# Patient Record
Sex: FEMALE | Race: BLACK OR AFRICAN AMERICAN | NOT HISPANIC OR LATINO | Employment: FULL TIME | URBAN - METROPOLITAN AREA
[De-identification: names, ages, dates, MRNs, and addresses within clinical notes are randomized per-mention and may not be internally consistent; named-entity substitution may affect disease eponyms.]

---

## 2023-05-11 LAB
NATRIURETIC PEPTIDE B (PG/ML) IN SER/PLAS: 67 PG/ML (ref 0–99)
TROPONIN I, HIGH SENSITIVITY: <3 NG/L (ref 0–34)

## 2023-08-28 LAB
ESTIMATED AVERAGE GLUCOSE FOR HBA1C: 100 MG/DL
HEMOGLOBIN A1C/HEMOGLOBIN TOTAL IN BLOOD: 5.1 %
THYROTROPIN (MIU/L) IN SER/PLAS BY DETECTION LIMIT <= 0.05 MIU/L: 1.7 MIU/L (ref 0.44–3.98)
THYROXINE (T4) FREE (NG/DL) IN SER/PLAS: 1.12 NG/DL (ref 0.78–1.48)

## 2023-12-04 PROBLEM — D84.9 IMMUNOCOMPROMISED STATE (MULTI): Status: ACTIVE | Noted: 2023-12-04

## 2023-12-04 PROBLEM — G47.33 MILD OBSTRUCTIVE SLEEP APNEA: Status: ACTIVE | Noted: 2023-12-04

## 2023-12-04 PROBLEM — E66.01 MORBID OBESITY (MULTI): Status: ACTIVE | Noted: 2023-12-04

## 2023-12-04 PROBLEM — R11.2 CINV (CHEMOTHERAPY-INDUCED NAUSEA AND VOMITING): Status: ACTIVE | Noted: 2023-12-04

## 2023-12-04 PROBLEM — E55.9 VITAMIN D DEFICIENCY: Status: ACTIVE | Noted: 2023-12-04

## 2023-12-04 PROBLEM — Z85.3 HISTORY OF LEFT BREAST CANCER: Status: ACTIVE | Noted: 2023-12-04

## 2023-12-04 PROBLEM — Z92.89 H/O SLEEP STUDY: Status: ACTIVE | Noted: 2023-12-04

## 2023-12-04 PROBLEM — C50.919 HER2-POSITIVE CARCINOMA OF BREAST (MULTI): Status: ACTIVE | Noted: 2023-12-04

## 2023-12-04 PROBLEM — K52.1 CHEMOTHERAPY-INDUCED DIARRHEA: Status: ACTIVE | Noted: 2023-12-04

## 2023-12-04 PROBLEM — H25.13 NUCLEAR SCLEROSIS OF BOTH EYES: Status: ACTIVE | Noted: 2023-12-04

## 2023-12-04 PROBLEM — R51.9 HEAD ACHE: Status: ACTIVE | Noted: 2023-12-04

## 2023-12-04 PROBLEM — Z90.11 STATUS POST RIGHT MASTECTOMY: Status: ACTIVE | Noted: 2023-12-04

## 2023-12-04 PROBLEM — C50.919 MALIGNANT NEOPLASM OF BREAST (MULTI): Status: ACTIVE | Noted: 2023-12-04

## 2023-12-04 PROBLEM — S86.819A TENDON RUPTURE, TRAUMATIC, PATELLA: Status: ACTIVE | Noted: 2023-12-04

## 2023-12-04 PROBLEM — R03.0 ELEVATED BLOOD PRESSURE READING: Status: ACTIVE | Noted: 2023-12-04

## 2023-12-04 PROBLEM — K63.5 COLONIC POLYP: Status: ACTIVE | Noted: 2023-12-04

## 2023-12-04 PROBLEM — M25.551 HIP PAIN, RIGHT: Status: ACTIVE | Noted: 2023-12-04

## 2023-12-04 PROBLEM — L60.1 ONYCHOLYSIS: Status: ACTIVE | Noted: 2023-12-04

## 2023-12-04 PROBLEM — Z17.31 HER2-POSITIVE CARCINOMA OF BREAST: Status: ACTIVE | Noted: 2023-12-04

## 2023-12-04 PROBLEM — N62 MACROMASTIA: Status: ACTIVE | Noted: 2023-12-04

## 2023-12-04 PROBLEM — R50.9 FEVER: Status: ACTIVE | Noted: 2023-12-04

## 2023-12-04 PROBLEM — K57.90 DIVERTICULOSIS: Status: ACTIVE | Noted: 2023-12-04

## 2023-12-04 PROBLEM — H04.123 BILATERAL DRY EYES: Status: ACTIVE | Noted: 2023-12-04

## 2023-12-04 PROBLEM — M16.11 PRIMARY OSTEOARTHRITIS OF RIGHT HIP: Status: ACTIVE | Noted: 2023-12-04

## 2023-12-04 PROBLEM — R01.1 NEWLY RECOGNIZED HEART MURMUR: Status: ACTIVE | Noted: 2023-12-04

## 2023-12-04 PROBLEM — R10.9 ABDOMINAL PAIN: Status: ACTIVE | Noted: 2023-12-04

## 2023-12-04 PROBLEM — H40.003 GLAUCOMA SUSPECT OF BOTH EYES: Status: ACTIVE | Noted: 2023-12-04

## 2023-12-04 PROBLEM — T45.1X5A CHEMOTHERAPY-INDUCED DIARRHEA: Status: ACTIVE | Noted: 2023-12-04

## 2023-12-04 PROBLEM — R00.2 PALPITATIONS: Status: ACTIVE | Noted: 2023-12-04

## 2023-12-04 PROBLEM — Z90.12 STATUS POST LEFT MASTECTOMY: Status: ACTIVE | Noted: 2023-12-04

## 2023-12-04 PROBLEM — E53.8 VITAMIN B12 DEFICIENCY: Status: ACTIVE | Noted: 2023-12-04

## 2023-12-04 PROBLEM — T45.1X5A CINV (CHEMOTHERAPY-INDUCED NAUSEA AND VOMITING): Status: ACTIVE | Noted: 2023-12-04

## 2023-12-04 PROBLEM — R52 BODY ACHES: Status: ACTIVE | Noted: 2023-12-04

## 2023-12-04 PROBLEM — R79.89 LOW VITAMIN D LEVEL: Status: ACTIVE | Noted: 2023-12-04

## 2023-12-04 PROBLEM — M25.569 JOINT PAIN, KNEE: Status: ACTIVE | Noted: 2023-12-04

## 2023-12-04 PROBLEM — R35.0 URINARY FREQUENCY: Status: ACTIVE | Noted: 2023-12-04

## 2023-12-04 PROBLEM — R68.2 DRY MOUTH: Status: ACTIVE | Noted: 2023-12-04

## 2023-12-04 PROBLEM — R05.9 COUGH: Status: ACTIVE | Noted: 2023-12-04

## 2023-12-04 PROBLEM — G47.00 INSOMNIA: Status: ACTIVE | Noted: 2023-12-04

## 2023-12-04 PROBLEM — C50.912 BREAST CANCER, LEFT (MULTI): Status: ACTIVE | Noted: 2023-12-04

## 2023-12-04 RX ORDER — LIDOCAINE AND PRILOCAINE 25; 25 MG/G; MG/G
1 CREAM TOPICAL
COMMUNITY
Start: 2021-12-03

## 2023-12-04 RX ORDER — CHOLECALCIFEROL (VITAMIN D3) 125 MCG
125 CAPSULE ORAL DAILY
COMMUNITY

## 2023-12-04 RX ORDER — VITAMIN E MIXED 400 UNIT
400 CAPSULE ORAL DAILY
COMMUNITY

## 2023-12-04 RX ORDER — MULTIVITAMIN
1 TABLET ORAL DAILY
COMMUNITY

## 2023-12-04 RX ORDER — ONDANSETRON HYDROCHLORIDE 8 MG/1
8 TABLET, FILM COATED ORAL EVERY 8 HOURS PRN
COMMUNITY
Start: 2021-12-03

## 2023-12-04 RX ORDER — DICLOFENAC SODIUM 75 MG/1
1 TABLET, DELAYED RELEASE ORAL
COMMUNITY
Start: 2023-08-07

## 2023-12-04 RX ORDER — CYCLOBENZAPRINE HCL 5 MG
5-10 TABLET ORAL EVERY 8 HOURS PRN
COMMUNITY
Start: 2023-09-15 | End: 2023-12-14

## 2023-12-04 RX ORDER — ACETAMINOPHEN, DIPHENHYDRAMINE HCL, PHENYLEPHRINE HCL 325; 25; 5 MG/1; MG/1; MG/1
1 TABLET ORAL DAILY
COMMUNITY

## 2023-12-04 RX ORDER — EXEMESTANE 25 MG/1
25 TABLET ORAL
COMMUNITY
End: 2023-12-19 | Stop reason: SDUPTHER

## 2023-12-08 ENCOUNTER — APPOINTMENT (OUTPATIENT)
Dept: HEMATOLOGY/ONCOLOGY | Facility: CLINIC | Age: 68
End: 2023-12-08

## 2023-12-19 ENCOUNTER — OFFICE VISIT (OUTPATIENT)
Dept: HEMATOLOGY/ONCOLOGY | Facility: HOSPITAL | Age: 68
End: 2023-12-19
Payer: MEDICARE

## 2023-12-19 VITALS
OXYGEN SATURATION: 97 % | HEIGHT: 68 IN | SYSTOLIC BLOOD PRESSURE: 113 MMHG | BODY MASS INDEX: 44.41 KG/M2 | DIASTOLIC BLOOD PRESSURE: 73 MMHG | WEIGHT: 293 LBS | HEART RATE: 105 BPM | TEMPERATURE: 98.1 F | RESPIRATION RATE: 20 BRPM

## 2023-12-19 DIAGNOSIS — C50.912 MALIGNANT NEOPLASM OF LEFT BREAST IN FEMALE, ESTROGEN RECEPTOR POSITIVE, UNSPECIFIED SITE OF BREAST (MULTI): ICD-10-CM

## 2023-12-19 DIAGNOSIS — Z17.0 MALIGNANT NEOPLASM OF LEFT BREAST IN FEMALE, ESTROGEN RECEPTOR POSITIVE, UNSPECIFIED SITE OF BREAST (MULTI): ICD-10-CM

## 2023-12-19 DIAGNOSIS — Z79.811 LONG TERM (CURRENT) USE OF AROMATASE INHIBITORS: ICD-10-CM

## 2023-12-19 PROCEDURE — 99214 OFFICE O/P EST MOD 30 MIN: CPT | Performed by: INTERNAL MEDICINE

## 2023-12-19 PROCEDURE — 1159F MED LIST DOCD IN RCRD: CPT | Performed by: INTERNAL MEDICINE

## 2023-12-19 PROCEDURE — 1125F AMNT PAIN NOTED PAIN PRSNT: CPT | Performed by: INTERNAL MEDICINE

## 2023-12-19 RX ORDER — EXEMESTANE 25 MG/1
25 TABLET ORAL
Qty: 30 TABLET | Refills: 5 | Status: SHIPPED | OUTPATIENT
Start: 2023-12-19

## 2023-12-19 ASSESSMENT — PAIN SCALES - GENERAL: PAINLEVEL: 4

## 2024-01-05 ENCOUNTER — APPOINTMENT (OUTPATIENT)
Dept: SLEEP MEDICINE | Facility: CLINIC | Age: 69
End: 2024-01-05
Payer: MEDICARE

## 2024-01-11 ENCOUNTER — TELEMEDICINE (OUTPATIENT)
Dept: PRIMARY CARE | Facility: CLINIC | Age: 69
End: 2024-01-11
Payer: MEDICARE

## 2024-01-11 DIAGNOSIS — E66.01 MORBID OBESITY DUE TO EXCESS CALORIES (MULTI): Primary | ICD-10-CM

## 2024-01-11 PROCEDURE — 99212 OFFICE O/P EST SF 10 MIN: CPT | Performed by: INTERNAL MEDICINE

## 2024-01-11 ASSESSMENT — ENCOUNTER SYMPTOMS
SHORTNESS OF BREATH: 0
CHILLS: 0
FEVER: 0
ABDOMINAL PAIN: 0
VOMITING: 0
NAUSEA: 0

## 2024-01-11 NOTE — PROGRESS NOTES
Subjective   Patient ID: Kim Henley is a 68 y.o. female who presents for virtual visit,    Patient states she is getting over what she believes was probably RSV. Sick contact from grandson. Is doing better now. Has had a recurrence of breat cancer and finished treatment. Needs a hip replacement but needs to lose weight first. Requesting a referral to weight loss clinic.         Review of Systems   Constitutional:  Negative for chills and fever.   Respiratory:  Negative for shortness of breath.    Cardiovascular:  Negative for chest pain.   Gastrointestinal:  Negative for abdominal pain, nausea and vomiting.       Objective   There were no vitals taken for this visit.    Physical Exam  Virtual visit  Assessment/Plan   Morbid obesity: will refer to weight loss clinic

## 2024-01-30 DIAGNOSIS — E66.01 MORBID OBESITY DUE TO EXCESS CALORIES (MULTI): Primary | ICD-10-CM

## 2024-02-05 ENCOUNTER — APPOINTMENT (OUTPATIENT)
Dept: ORTHOPEDIC SURGERY | Facility: CLINIC | Age: 69
End: 2024-02-05
Payer: MEDICARE

## 2024-03-11 ENCOUNTER — APPOINTMENT (OUTPATIENT)
Dept: ORTHOPEDIC SURGERY | Facility: CLINIC | Age: 69
End: 2024-03-11
Payer: MEDICARE

## 2024-04-08 ENCOUNTER — OFFICE VISIT (OUTPATIENT)
Dept: PRIMARY CARE | Facility: CLINIC | Age: 69
End: 2024-04-08
Payer: MEDICARE

## 2024-04-08 VITALS
HEIGHT: 67 IN | TEMPERATURE: 97.9 F | HEART RATE: 109 BPM | BODY MASS INDEX: 45.04 KG/M2 | RESPIRATION RATE: 16 BRPM | DIASTOLIC BLOOD PRESSURE: 81 MMHG | SYSTOLIC BLOOD PRESSURE: 125 MMHG | WEIGHT: 287 LBS | OXYGEN SATURATION: 98 %

## 2024-04-08 DIAGNOSIS — M25.551 HIP PAIN, RIGHT: Primary | ICD-10-CM

## 2024-04-08 PROCEDURE — 1036F TOBACCO NON-USER: CPT | Performed by: INTERNAL MEDICINE

## 2024-04-08 PROCEDURE — 99213 OFFICE O/P EST LOW 20 MIN: CPT | Performed by: INTERNAL MEDICINE

## 2024-04-08 PROCEDURE — 1170F FXNL STATUS ASSESSED: CPT | Performed by: INTERNAL MEDICINE

## 2024-04-08 PROCEDURE — 1125F AMNT PAIN NOTED PAIN PRSNT: CPT | Performed by: INTERNAL MEDICINE

## 2024-04-08 ASSESSMENT — ENCOUNTER SYMPTOMS
OCCASIONAL FEELINGS OF UNSTEADINESS: 1
NAUSEA: 0
VOMITING: 0
SHORTNESS OF BREATH: 0
CHILLS: 0
DEPRESSION: 0
LOSS OF SENSATION IN FEET: 0
ABDOMINAL PAIN: 0
FEVER: 0

## 2024-04-08 ASSESSMENT — PAIN SCALES - GENERAL: PAINLEVEL: 5

## 2024-04-08 ASSESSMENT — PATIENT HEALTH QUESTIONNAIRE - PHQ9
SUM OF ALL RESPONSES TO PHQ9 QUESTIONS 1 AND 2: 0
2. FEELING DOWN, DEPRESSED OR HOPELESS: NOT AT ALL
1. LITTLE INTEREST OR PLEASURE IN DOING THINGS: NOT AT ALL

## 2024-04-08 ASSESSMENT — ACTIVITIES OF DAILY LIVING (ADL)
MANAGING_FINANCES: INDEPENDENT
DRESSING: INDEPENDENT
GROCERY_SHOPPING: INDEPENDENT
TAKING_MEDICATION: INDEPENDENT
BATHING: INDEPENDENT
DOING_HOUSEWORK: INDEPENDENT

## 2024-04-08 NOTE — PROGRESS NOTES
"Subjective   Patient ID: Kim Henley is a 68 y.o. female who presents for follow up.    Presents for follow up. Will be getting hip replacement surgery in [facility] next [place]. Will need some preadmission testing. Also requesting referral to Podiatry for her toenails damaged by chemo.         Review of Systems   Constitutional:  Negative for chills and fever.   Respiratory:  Negative for shortness of breath.    Cardiovascular:  Negative for chest pain.   Gastrointestinal:  Negative for abdominal pain, nausea and vomiting.       Objective   /81   Pulse 109   Temp 36.6 °C (97.9 °F)   Resp 16   Ht 1.702 m (5' 7\")   Wt 130 kg (287 lb)   SpO2 98%   BMI 44.95 kg/m²     Physical Exam  Gen appearance: well groomed in NAD  A & O: alert and oriented x 3  CV: RRR   Lungs: CTA bilaterally  Extr: no edema. Foot exam deferred    Assessment/Plan   Hip pain: will return for preadmission testing needed for her surgery.       "

## 2024-04-12 ENCOUNTER — TELEPHONE (OUTPATIENT)
Dept: ORTHOPEDIC SURGERY | Facility: HOSPITAL | Age: 69
End: 2024-04-12
Payer: MEDICARE

## 2024-04-12 DIAGNOSIS — M25.569 ARTHRALGIA OF KNEE, UNSPECIFIED LATERALITY: ICD-10-CM

## 2024-04-12 DIAGNOSIS — S86.819D: ICD-10-CM

## 2024-04-12 NOTE — TELEPHONE ENCOUNTER
Patient called and recommend a disability parking placard.     Her previous placard will be expiring and was for 5 years. Renewed and sent to patient via [employer].     Nydia Snow LPN

## 2024-04-29 ENCOUNTER — APPOINTMENT (OUTPATIENT)
Dept: ORTHOPEDIC SURGERY | Facility: CLINIC | Age: 69
End: 2024-04-29
Payer: MEDICARE

## 2024-04-29 ENCOUNTER — OFFICE VISIT (OUTPATIENT)
Dept: PRIMARY CARE | Facility: CLINIC | Age: 69
End: 2024-04-29
Payer: MEDICARE

## 2024-04-29 VITALS
BODY MASS INDEX: 45.71 KG/M2 | DIASTOLIC BLOOD PRESSURE: 77 MMHG | TEMPERATURE: 97.3 F | HEART RATE: 97 BPM | WEIGHT: 291.2 LBS | OXYGEN SATURATION: 99 % | RESPIRATION RATE: 18 BRPM | HEIGHT: 67 IN | SYSTOLIC BLOOD PRESSURE: 117 MMHG

## 2024-04-29 DIAGNOSIS — Z01.818 PREOP GENERAL PHYSICAL EXAM: Primary | ICD-10-CM

## 2024-04-29 DIAGNOSIS — M25.551 HIP PAIN, RIGHT: ICD-10-CM

## 2024-04-29 LAB
ALBUMIN SERPL BCP-MCNC: 4.3 G/DL (ref 3.4–5)
ALP SERPL-CCNC: 91 U/L (ref 33–136)
ALT SERPL W P-5'-P-CCNC: 15 U/L (ref 7–45)
ANION GAP SERPL CALC-SCNC: 14 MMOL/L (ref 10–20)
APTT PPP: 35 SECONDS (ref 27–38)
AST SERPL W P-5'-P-CCNC: 33 U/L (ref 9–39)
BILIRUB SERPL-MCNC: 0.4 MG/DL (ref 0–1.2)
BUN SERPL-MCNC: 16 MG/DL (ref 6–23)
CALCIUM SERPL-MCNC: 9.4 MG/DL (ref 8.6–10.6)
CHLORIDE SERPL-SCNC: 102 MMOL/L (ref 98–107)
CO2 SERPL-SCNC: 28 MMOL/L (ref 21–32)
CREAT SERPL-MCNC: 0.68 MG/DL (ref 0.5–1.05)
EGFRCR SERPLBLD CKD-EPI 2021: >90 ML/MIN/1.73M*2
ERYTHROCYTE [DISTWIDTH] IN BLOOD BY AUTOMATED COUNT: 13.2 % (ref 11.5–14.5)
EST. AVERAGE GLUCOSE BLD GHB EST-MCNC: 91 MG/DL
GLUCOSE SERPL-MCNC: 70 MG/DL (ref 74–99)
HBA1C MFR BLD: 4.8 %
HCT VFR BLD AUTO: 42.9 % (ref 36–46)
HGB BLD-MCNC: 14.1 G/DL (ref 12–16)
INR PPP: 1 (ref 0.9–1.1)
MCH RBC QN AUTO: 32.6 PG (ref 26–34)
MCHC RBC AUTO-ENTMCNC: 32.9 G/DL (ref 32–36)
MCV RBC AUTO: 99 FL (ref 80–100)
NRBC BLD-RTO: 0 /100 WBCS (ref 0–0)
PLATELET # BLD AUTO: 214 X10*3/UL (ref 150–450)
POC APPEARANCE, URINE: CLEAR
POC BILIRUBIN, URINE: ABNORMAL
POC BLOOD, URINE: NEGATIVE
POC COLOR, URINE: YELLOW
POC GLUCOSE, URINE: NEGATIVE MG/DL
POC KETONES, URINE: NEGATIVE MG/DL
POC LEUKOCYTES, URINE: NEGATIVE
POC NITRITE,URINE: NEGATIVE
POC PH, URINE: 6 PH
POC PROTEIN, URINE: ABNORMAL MG/DL
POC SPECIFIC GRAVITY, URINE: >=1.03
POC UROBILINOGEN, URINE: 1 EU/DL
POTASSIUM SERPL-SCNC: 6 MMOL/L (ref 3.5–5.3)
PROT SERPL-MCNC: 7.7 G/DL (ref 6.4–8.2)
PROTHROMBIN TIME: 10.9 SECONDS (ref 9.8–12.8)
RBC # BLD AUTO: 4.32 X10*6/UL (ref 4–5.2)
SODIUM SERPL-SCNC: 138 MMOL/L (ref 136–145)
WBC # BLD AUTO: 3.9 X10*3/UL (ref 4.4–11.3)

## 2024-04-29 PROCEDURE — 99214 OFFICE O/P EST MOD 30 MIN: CPT | Performed by: INTERNAL MEDICINE

## 2024-04-29 PROCEDURE — 83036 HEMOGLOBIN GLYCOSYLATED A1C: CPT | Mod: GA | Performed by: INTERNAL MEDICINE

## 2024-04-29 PROCEDURE — 85610 PROTHROMBIN TIME: CPT | Mod: GA | Performed by: INTERNAL MEDICINE

## 2024-04-29 PROCEDURE — 80053 COMPREHEN METABOLIC PANEL: CPT | Performed by: INTERNAL MEDICINE

## 2024-04-29 PROCEDURE — 1125F AMNT PAIN NOTED PAIN PRSNT: CPT | Performed by: INTERNAL MEDICINE

## 2024-04-29 PROCEDURE — 85027 COMPLETE CBC AUTOMATED: CPT | Performed by: INTERNAL MEDICINE

## 2024-04-29 PROCEDURE — 1036F TOBACCO NON-USER: CPT | Performed by: INTERNAL MEDICINE

## 2024-04-29 PROCEDURE — 93010 ELECTROCARDIOGRAM REPORT: CPT | Performed by: INTERNAL MEDICINE

## 2024-04-29 PROCEDURE — 81002 URINALYSIS NONAUTO W/O SCOPE: CPT | Performed by: INTERNAL MEDICINE

## 2024-04-29 PROCEDURE — 36415 COLL VENOUS BLD VENIPUNCTURE: CPT | Performed by: INTERNAL MEDICINE

## 2024-04-29 PROCEDURE — 93005 ELECTROCARDIOGRAM TRACING: CPT | Performed by: INTERNAL MEDICINE

## 2024-04-29 PROCEDURE — 87086 URINE CULTURE/COLONY COUNT: CPT | Mod: GA | Performed by: INTERNAL MEDICINE

## 2024-04-29 SDOH — ECONOMIC STABILITY: FOOD INSECURITY: WITHIN THE PAST 12 MONTHS, YOU WORRIED THAT YOUR FOOD WOULD RUN OUT BEFORE YOU GOT MONEY TO BUY MORE.: NEVER TRUE

## 2024-04-29 SDOH — ECONOMIC STABILITY: FOOD INSECURITY: WITHIN THE PAST 12 MONTHS, THE FOOD YOU BOUGHT JUST DIDN'T LAST AND YOU DIDN'T HAVE MONEY TO GET MORE.: NEVER TRUE

## 2024-04-29 ASSESSMENT — ENCOUNTER SYMPTOMS
CHILLS: 0
FEVER: 0
DEPRESSION: 0
ABDOMINAL PAIN: 0
NAUSEA: 0
OCCASIONAL FEELINGS OF UNSTEADINESS: 1
SHORTNESS OF BREATH: 0
VOMITING: 0
LOSS OF SENSATION IN FEET: 0

## 2024-04-29 ASSESSMENT — LIFESTYLE VARIABLES
AUDIT-C TOTAL SCORE: 0
HOW OFTEN DO YOU HAVE A DRINK CONTAINING ALCOHOL: NEVER
HOW OFTEN DO YOU HAVE SIX OR MORE DRINKS ON ONE OCCASION: NEVER
SKIP TO QUESTIONS 9-10: 1
HOW MANY STANDARD DRINKS CONTAINING ALCOHOL DO YOU HAVE ON A TYPICAL DAY: PATIENT DOES NOT DRINK

## 2024-04-29 ASSESSMENT — PAIN SCALES - GENERAL: PAINLEVEL: 4

## 2024-04-29 NOTE — PROGRESS NOTES
"Subjective   Patient ID: Kim Henley is a 68 y.o. female who presents for Follow-up (Having hip surgery).    Presents for preop evaluation for upcoming hip surgery. No complaints today except for some increased hip pain secondary to having stopped her NSAID's in prep for her upcoming total right hip arthroplasty.          Review of Systems   Constitutional:  Negative for chills and fever.   Respiratory:  Negative for shortness of breath.    Cardiovascular:  Negative for chest pain.   Gastrointestinal:  Negative for abdominal pain, nausea and vomiting.       Objective   /77 (BP Location: Left arm, Patient Position: Sitting, BP Cuff Size: Adult)   Pulse 97   Temp 36.3 °C (97.3 °F) (Temporal)   Resp 18   Ht 1.702 m (5' 7\")   Wt 132 kg (291 lb 3.2 oz)   SpO2 99%   BMI 45.61 kg/m²     Physical Exam  Gen: well groomed in NAD  A & O: alert and oriented x 3  HEENT: PEERL, EOMI, TM's clear bilaterally, no carotid bruits, no palpable thyroid nodules  CV: RRR  Lungs: CTA bilaterally  Abd: soft, NT/ND,no palp masses  Extr: no edema  Breasts: no palpable nodules or nipple discharge  Neuro: CN 2-12 intact, motor 5/5, sensation intact  Pelvic: deferred to Gyn   Assessment/Plan   Health maintenance: patient reports no constitutional symptoms. Reports has stopped NSAIDs in prep for her upcoming surgery/right hip arthroplasty in [facility]. No cardiac clearance was requested by the surgeon. Labs and urine drawn as requested by the surgeon, Dr. Rio Renteria.   Right hip pain: patient will continue with Tylenol for pain relief.       "

## 2024-04-30 DIAGNOSIS — R89.9 ABNORMAL LABORATORY TEST: Primary | ICD-10-CM

## 2024-05-01 LAB — BACTERIA UR CULT: ABNORMAL

## 2024-05-02 DIAGNOSIS — N39.0 URINARY TRACT INFECTION WITHOUT HEMATURIA, SITE UNSPECIFIED: ICD-10-CM

## 2024-05-08 ENCOUNTER — APPOINTMENT (OUTPATIENT)
Dept: PRIMARY CARE | Facility: CLINIC | Age: 69
End: 2024-05-08
Payer: MEDICARE

## 2024-05-09 ENCOUNTER — CLINICAL SUPPORT (OUTPATIENT)
Dept: PRIMARY CARE | Facility: CLINIC | Age: 69
End: 2024-05-09
Payer: MEDICARE

## 2024-05-09 DIAGNOSIS — R89.9 ABNORMAL LABORATORY TEST: ICD-10-CM

## 2024-05-09 DIAGNOSIS — E87.6 HYPOKALEMIA: Primary | ICD-10-CM

## 2024-05-09 DIAGNOSIS — Z09 NEED FOR IMMUNIZATION FOLLOW-UP: Primary | ICD-10-CM

## 2024-05-09 DIAGNOSIS — Z23 IMMUNIZATION DUE: ICD-10-CM

## 2024-05-09 DIAGNOSIS — N39.0 URINARY TRACT INFECTION WITHOUT HEMATURIA, SITE UNSPECIFIED: ICD-10-CM

## 2024-05-09 LAB
ANION GAP SERPL CALC-SCNC: 14 MMOL/L (ref 10–20)
BUN SERPL-MCNC: 20 MG/DL (ref 6–23)
CALCIUM SERPL-MCNC: 9.8 MG/DL (ref 8.6–10.6)
CHLORIDE SERPL-SCNC: 102 MMOL/L (ref 98–107)
CO2 SERPL-SCNC: 28 MMOL/L (ref 21–32)
CREAT SERPL-MCNC: 0.75 MG/DL (ref 0.5–1.05)
EGFRCR SERPLBLD CKD-EPI 2021: 87 ML/MIN/1.73M*2
GLUCOSE SERPL-MCNC: 83 MG/DL (ref 74–99)
POTASSIUM SERPL-SCNC: 4.1 MMOL/L (ref 3.5–5.3)
SODIUM SERPL-SCNC: 140 MMOL/L (ref 136–145)

## 2024-05-09 PROCEDURE — 87086 URINE CULTURE/COLONY COUNT: CPT | Performed by: INTERNAL MEDICINE

## 2024-05-09 PROCEDURE — 36415 COLL VENOUS BLD VENIPUNCTURE: CPT

## 2024-05-09 PROCEDURE — 80048 BASIC METABOLIC PNL TOTAL CA: CPT

## 2024-05-09 PROCEDURE — G0009 ADMIN PNEUMOCOCCAL VACCINE: HCPCS | Performed by: INTERNAL MEDICINE

## 2024-05-09 NOTE — PROGRESS NOTES
Pt presented for blood Blood draw draw and Prevnar vaccine. Venipuncture successful. Prevnar vaccine given left deltoid pt tolerated well VIS given and reviewed.

## 2024-05-10 LAB — BACTERIA UR CULT: NORMAL

## 2024-05-20 ENCOUNTER — TELEPHONE (OUTPATIENT)
Dept: PRIMARY CARE | Facility: CLINIC | Age: 69
End: 2024-05-20
Payer: MEDICARE

## 2024-05-21 DIAGNOSIS — Z01.89 PHYSICAL THERAPY EVALUATION, INITIAL: Primary | ICD-10-CM

## 2024-05-24 ENCOUNTER — TELEPHONE (OUTPATIENT)
Dept: HOME HEALTH SERVICES | Facility: HOME HEALTH | Age: 69
End: 2024-05-24
Payer: MEDICARE

## 2024-05-24 ENCOUNTER — HOME HEALTH ADMISSION (OUTPATIENT)
Dept: HOME HEALTH SERVICES | Facility: HOME HEALTH | Age: 69
End: 2024-05-24
Payer: MEDICARE

## 2024-05-24 DIAGNOSIS — M25.551 HIP PAIN, RIGHT: Primary | ICD-10-CM

## 2024-05-24 NOTE — TELEPHONE ENCOUNTER
Brayan Victoria,    [facility] has received a [facility] referral on this pt for home physical therapy s/p R THR on 5/16. Unfortunately, we are unable to use your office visit note on 4/29 as our F2F as this visit was prior to the patient's surgery.  Pt must have a F2F s/p ortho surgery. Aleksandra re-submit referral once pt has had a F2F visit. If you have any questions, you can reach [facility] at .    Thank you,    Heather Grayson RN

## 2024-06-04 DIAGNOSIS — M25.551 HIP PAIN, RIGHT: Primary | ICD-10-CM

## 2024-06-05 ENCOUNTER — APPOINTMENT (OUTPATIENT)
Dept: RADIOLOGY | Facility: HOSPITAL | Age: 69
End: 2024-06-05
Payer: MEDICARE

## 2024-06-14 ENCOUNTER — APPOINTMENT (OUTPATIENT)
Dept: HEMATOLOGY/ONCOLOGY | Facility: CLINIC | Age: 69
End: 2024-06-14
Payer: MEDICARE

## 2024-06-18 ENCOUNTER — APPOINTMENT (OUTPATIENT)
Dept: HEMATOLOGY/ONCOLOGY | Facility: HOSPITAL | Age: 69
End: 2024-06-18
Payer: MEDICARE

## 2024-06-20 ENCOUNTER — OFFICE VISIT (OUTPATIENT)
Dept: HEMATOLOGY/ONCOLOGY | Facility: CLINIC | Age: 69
End: 2024-06-20
Payer: MEDICARE

## 2024-06-20 VITALS
WEIGHT: 293 LBS | TEMPERATURE: 97.5 F | DIASTOLIC BLOOD PRESSURE: 72 MMHG | HEART RATE: 102 BPM | OXYGEN SATURATION: 96 % | SYSTOLIC BLOOD PRESSURE: 119 MMHG | BODY MASS INDEX: 46.54 KG/M2

## 2024-06-20 DIAGNOSIS — Z92.21 HISTORY OF ANTINEOPLASTIC CHEMOTHERAPY: ICD-10-CM

## 2024-06-20 DIAGNOSIS — C50.912 MALIGNANT NEOPLASM OF LEFT BREAST IN FEMALE, ESTROGEN RECEPTOR POSITIVE, UNSPECIFIED SITE OF BREAST (MULTI): ICD-10-CM

## 2024-06-20 DIAGNOSIS — Z79.811 ENCOUNTER FOR MONITORING AROMATASE INHIBITOR THERAPY: ICD-10-CM

## 2024-06-20 DIAGNOSIS — R39.81 FUNCTIONAL URINARY INCONTINENCE: ICD-10-CM

## 2024-06-20 DIAGNOSIS — Z51.81 ENCOUNTER FOR MONITORING AROMATASE INHIBITOR THERAPY: ICD-10-CM

## 2024-06-20 DIAGNOSIS — Z17.0 MALIGNANT NEOPLASM OF LEFT BREAST IN FEMALE, ESTROGEN RECEPTOR POSITIVE, UNSPECIFIED SITE OF BREAST (MULTI): ICD-10-CM

## 2024-06-20 DIAGNOSIS — Z17.0 MALIGNANT NEOPLASM OF RIGHT BREAST IN FEMALE, ESTROGEN RECEPTOR POSITIVE, UNSPECIFIED SITE OF BREAST (MULTI): Primary | ICD-10-CM

## 2024-06-20 DIAGNOSIS — C50.911 MALIGNANT NEOPLASM OF RIGHT BREAST IN FEMALE, ESTROGEN RECEPTOR POSITIVE, UNSPECIFIED SITE OF BREAST (MULTI): Primary | ICD-10-CM

## 2024-06-20 DIAGNOSIS — Z90.13 HISTORY OF BILATERAL MASTECTOMY: ICD-10-CM

## 2024-06-20 PROCEDURE — 1126F AMNT PAIN NOTED NONE PRSNT: CPT | Performed by: NURSE PRACTITIONER

## 2024-06-20 PROCEDURE — 99215 OFFICE O/P EST HI 40 MIN: CPT | Performed by: NURSE PRACTITIONER

## 2024-06-20 PROCEDURE — 1159F MED LIST DOCD IN RCRD: CPT | Performed by: NURSE PRACTITIONER

## 2024-06-20 PROCEDURE — 1160F RVW MEDS BY RX/DR IN RCRD: CPT | Performed by: NURSE PRACTITIONER

## 2024-06-20 RX ORDER — EXEMESTANE 25 MG/1
25 TABLET ORAL
Qty: 30 TABLET | Refills: 5 | Status: SHIPPED | OUTPATIENT
Start: 2024-06-20

## 2024-06-20 ASSESSMENT — PAIN SCALES - GENERAL: PAINLEVEL: 0-NO PAIN

## 2024-06-20 NOTE — PROGRESS NOTES
Oncology Follow-Up    Kim Henley  [id number]            Breast         AJCC Edition: 8th (AJCC), Diagnosis Date: 23-Oct-2021, IA, pT1b pN0 cM0 G3      Treatment Synopsis:    67-year-old postmenopausal AA female with prior history of left-sided invasive ductal carcinoma, stage IA (T1b N0 M0).  Patient now has contralateral her2 positive  breast cancer with details as foThe patient's breast cancer was diagnosed on October 23, 2021, and is estrogen receptor positive, progesterone receptor negative, and HER-2/godwin positive.         CURRENT THERAPY: Aromasin 25 mg by mouth daily     Details of her history:   1987: Diagnosed with left breast cancer. Patient underwent a left lumpectomy with ALND and radiation   2008: Left breast recurrence. Patient underwent a left breast mastectomy with a Latissimus Dorsi Flap, Arimidex x 5 years   08/13/2021: Patient underwent a right mammogram and US. This showed 0.6 x 0.5 x 0.5 cm mass at 12:00, 1 cm from the nipple.    09/07/2021: Patient underwent an US guided core biopsy of right breast mass   10/28/2021: Patient underwent a right breast mastectomy.    12/03/2021: Received her first dose of Taxol Herceptin   03/03/2021: First dose maintenance Herceptin    12/01/2022: Received the last dose of  maintenance Trastuzumab   06/02/2023: Started Aromasin 25 mg by mouth daily      Omid Alvarez presents for her Oncology Follow Up Visit accompanied by her , Mikie. She is a [place] woman who recently had a right hip replacement with a good outcome. Her issue is now with her left leg as she had compensated for her right for so long, it is now painful.  is using a walker to ambulate. She is in PT for this.  rates her energy level as 5-6/10 and reports no distress. She has urinary incontinence. She reports right ankle swelling since surgery.  is bothered by her inability to lose weight. She states, and her  agrees, that she eats very little and still cannot lose weight. She  states she never had abdominal obesity until recently.  continues on exemestane, she felt that this may be the reason she cannot lose weight. She is also concerned that exemestane may cause heart issues.  denies any unusual headaches, depression, cough, shortness of breath, problems swallowing, changes in chest/breast area (though does want me to look at one small area that is new), abdominal pain, bone or muscle pain (other then mentioned above), vaginal bleeding, rectal bleeding, blood in the urine, vaginal dryness, swelling arms or legs, new or unusual skin moles or lesions.     Objective      Vitals:    06/20/24 1403   BP: 119/72   Pulse: 102   Temp: 36.4 °C (97.5 °F)   SpO2: 96%        Constitutional: Well developed, alert/oriented x3, no distress, cooperative   Eyes: clear sclera   ENMT: mucous membranes moist, no apparent lesions   Head/Neck: Neck supple, no bruits   Respiratory/Thorax: Patent airways, normal breath sounds with good chest expansion   Cardiovascular: Regular rate and rhythm, no murmurs, 2+ equal pulses of the extremities,   Gastrointestinal: Nondistended, soft, non-tender, no masses palpable, no organomegaly   Musculoskeletal: ROM intact, no joint swelling, normal strength   Extremities: normal extremities, no edema, cyanosis, contusions or wounds   Neurological: alert and oriented x3,  normal strength   Breast:     Lymphatic: No significant lymphadenopathy   Psychological: Appropriate mood and behavior   Skin: Warm and dry, no lesions, no rashes      Physical Exam  Chest:          Comments: Bilateral mastectomy with well healed incisions, and well healed bilateral axillary incisions; no masses, nodules, skin changes, discharge. There is redundant skin (dog ears) bilaterally with the right more significant. The area of concern in the left chest/breast area is fat necrosis.         Lab Results   Component Value Date    WBC 3.9 (L) 04/29/2024    HGB 14.1 04/29/2024    HCT 42.9 04/29/2024     MCV 99 2024     2024       Chemistry    Lab Results   Component Value Date/Time     2024 1239    K 4.1 2024 1239     2024 1239    CO2 28 2024 1239    BUN 20 2024 1239    CREATININE 0.75 2024 1239    Lab Results   Component Value Date/Time    CALCIUM 9.8 2024 1239    ALKPHOS 91 2024 1409    AST 33 2024 1409    ALT 15 2024 1409    BILITOT 0.4 2024 1409         Imagin2022 09:45 [phone] [id number]     Exam Information    Status Exam Begun Exam Ended   Final 2022 09:15 2022 09:31     Study Result    Narrative & Impression   MRN: [id number]  Patient Name: GALLO SANDOVAL     STUDY:  DIGITAL DIAG MAMM LEFT W/MARIOLA UNILAT;  2022 9:31 am     ACCESSION NUMBER(S):  [id number]     ORDERING CLINICIAN:  BENJAMIN BILLINGSLEY     INDICATION:  Screening. History of breast cancer. Left mastectomy in  with  TRAM reconstruction. Right mastectomy in .     COMPARISON:  2021, 2020, 2019     FINDINGS:  2D and tomosynthesis images were reviewed at 1 mm slice thickness.     The breast tissue is almost entirely fatty.     Stable postsurgical changes from mastectomy and TRAM flap  reconstruction of the left breast. A significant amount of residual  breast tissue is not appreciated.  No suspicious masses or  calcifications are identified.     IMPRESSION:  No mammographic evidence of malignancy. Stable postsurgical changes.     BI-RADS CATEGORY:  Category: 2 - Benign.  Recommendation: Screening as clinically indicated.     Assessment/Plan    Gallo is a 69 yo woman with a hx of bilateral and recurrent breast cancer. The first, of the left breast was diagnosed in  with the second in . She was recently diagnosed with right IDC in 2021. She is s/p bilateral mastectomy, TH chemotherapy, and initiated exemestane in 2023. There is no evidence of recurrent disease on today's exam.     Plan:  Exam is  negative.  Will refer to Dr. Solomon for evaluation for inability to lose weight.  Will refer to Dr. Norris in GYN-oncology for urinary incontinence.  Bone density scheduled 8/23/24@ 9:30a.m., encouraged to keep appointment.  Area of concern is fat necrosis (left chest/breast).  Encouraged monthly breast self exams, plant based diet, keep alcohol <3 drinks/week, exercise at least 2.5 hours/week.  We reviewed signs/symptoms of recurrence including new masses, new pigmented lesion, tugging or pulling of the skin, nipple discharge, rash in or around the chest area, or any new pain/symptom that doesn't resolve within a 2-3 weeks.  All of 's questions/concerns were addressed.  Over 30 minutes of time was spent with this patient with >50% of the time with education, counseling, and coordination of care.   I will see  back in December. She will call with any concerns in the interim.      Diagnoses and all orders for this visit:  Malignant neoplasm of right breast in female, estrogen receptor positive, unspecified site of breast (Multi)  -     Clinic Appointment Request Follow up; Future  -     Referral to Urogynecology; Future  -     Referral to Endocrinology; Future  Encounter for monitoring aromatase inhibitor therapy  -     Clinic Appointment Request Follow up; Future  -     Referral to Urogynecology; Future  -     Referral to Endocrinology; Future  History of bilateral mastectomy  -     Clinic Appointment Request Follow up; Future  -     Referral to Urogynecology; Future  -     Referral to Endocrinology; Future  History of antineoplastic chemotherapy  Malignant neoplasm of left breast in female, estrogen receptor positive, unspecified site of breast (Multi)  -     Clinic Appointment Request  -     exemestane (Aromasin) 25 mg tablet; Take 1 tablet (25 mg total) by mouth once daily.  Functional urinary incontinence  -     Clinic Appointment Request Follow up; Future  -     Referral to Urogynecology; Future  -      Referral to Endocrinology; Future        Rosibel Porter, APRN-CNP

## 2024-06-20 NOTE — PATIENT INSTRUCTIONS
"1. Exercise 2.5 hours per week; bone strengthening, cardio-vascular, resistance training.  2. Please do self breast exams monthly.  3. Keep alcohol under 3 drinks per week.  4. Sun safety - limit sun exposure from 11a-2p when its at its hottest, apply 15-30 sun block and re-apply every 1-2 hours if perspiring or swimming.  5. Eat a plant based diet, add in oily fishes such as mackerel, tuna, and salmon.  6. Get in at least 1,000 mg of calcium per day through diet or supplement for bone strength. Examples of foods higher in calcium are milk, yogurt, fruited yogurt, oranges, fortified orange juice, almonds, almond milk, broccoli, spinach, bok jayla, mustard greens, puddings, custards, ice cream, fortified cereals, bars, and crackers.   7. Continue exemestane 25mg daily.  8. I have placed referral to Dr. Norris in uro-gynecology for incontinence, and Dr. Solomon in endocrinology for difficulty with weight loss.   9. Please call the office if any new mass or rash in or around breast, or any uncontrolled symptoms that last over 2-3 weeks at [phone].  10. Your exam is negative today. The little \"bumps\" you are feeling is fat necrosis. This happens when there is damage to fat cells.  11. It was so to meet you and Mikie, today, ! I enjoyed our visit. I will see you back in December. Please call or contact me through [facility] with any questions or concerns.  Have a nice spring and summer!  Thank you for choosing [facility] for your care.      "

## 2024-08-23 ENCOUNTER — APPOINTMENT (OUTPATIENT)
Dept: RADIOLOGY | Facility: CLINIC | Age: 69
End: 2024-08-23
Payer: MEDICARE

## 2024-09-10 ENCOUNTER — APPOINTMENT (OUTPATIENT)
Dept: OBSTETRICS AND GYNECOLOGY | Facility: CLINIC | Age: 69
End: 2024-09-10
Payer: MEDICARE

## 2024-09-10 VITALS
BODY MASS INDEX: 44.41 KG/M2 | HEIGHT: 68 IN | DIASTOLIC BLOOD PRESSURE: 76 MMHG | SYSTOLIC BLOOD PRESSURE: 130 MMHG | WEIGHT: 293 LBS

## 2024-09-10 DIAGNOSIS — R39.81 FUNCTIONAL URINARY INCONTINENCE: ICD-10-CM

## 2024-09-10 DIAGNOSIS — F41.9 ANXIETY: ICD-10-CM

## 2024-09-10 DIAGNOSIS — Z79.811 ENCOUNTER FOR MONITORING AROMATASE INHIBITOR THERAPY: ICD-10-CM

## 2024-09-10 DIAGNOSIS — Z51.81 ENCOUNTER FOR MONITORING AROMATASE INHIBITOR THERAPY: ICD-10-CM

## 2024-09-10 DIAGNOSIS — N32.81 OAB (OVERACTIVE BLADDER): Primary | ICD-10-CM

## 2024-09-10 DIAGNOSIS — C50.911 MALIGNANT NEOPLASM OF RIGHT BREAST IN FEMALE, ESTROGEN RECEPTOR POSITIVE, UNSPECIFIED SITE OF BREAST (MULTI): ICD-10-CM

## 2024-09-10 DIAGNOSIS — Z90.13 HISTORY OF BILATERAL MASTECTOMY: ICD-10-CM

## 2024-09-10 DIAGNOSIS — Z17.0 MALIGNANT NEOPLASM OF RIGHT BREAST IN FEMALE, ESTROGEN RECEPTOR POSITIVE, UNSPECIFIED SITE OF BREAST (MULTI): ICD-10-CM

## 2024-09-10 LAB
POC APPEARANCE, URINE: CLEAR
POC BILIRUBIN, URINE: NEGATIVE
POC BLOOD, URINE: NEGATIVE
POC COLOR, URINE: YELLOW
POC GLUCOSE, URINE: NEGATIVE MG/DL
POC KETONES, URINE: NEGATIVE MG/DL
POC LEUKOCYTES, URINE: NEGATIVE
POC NITRITE,URINE: NEGATIVE
POC PH, URINE: 7 PH
POC PROTEIN, URINE: NEGATIVE MG/DL
POC SPECIFIC GRAVITY, URINE: 1.01
POC UROBILINOGEN, URINE: 0.2 EU/DL

## 2024-09-10 PROCEDURE — 3008F BODY MASS INDEX DOCD: CPT | Performed by: OBSTETRICS & GYNECOLOGY

## 2024-09-10 PROCEDURE — 1159F MED LIST DOCD IN RCRD: CPT | Performed by: OBSTETRICS & GYNECOLOGY

## 2024-09-10 PROCEDURE — 99205 OFFICE O/P NEW HI 60 MIN: CPT | Performed by: OBSTETRICS & GYNECOLOGY

## 2024-09-10 PROCEDURE — 81003 URINALYSIS AUTO W/O SCOPE: CPT | Performed by: OBSTETRICS & GYNECOLOGY

## 2024-09-10 RX ORDER — MIRABEGRON 25 MG/1
25 TABLET, FILM COATED, EXTENDED RELEASE ORAL DAILY
Qty: 30 TABLET | Refills: 2 | Status: SHIPPED | OUTPATIENT
Start: 2024-09-10

## 2024-09-10 ASSESSMENT — ENCOUNTER SYMPTOMS
MUSCULOSKELETAL NEGATIVE: 1
CARDIOVASCULAR NEGATIVE: 1
ENDOCRINE NEGATIVE: 1
PSYCHIATRIC NEGATIVE: 1
EYES NEGATIVE: 1
GASTROINTESTINAL NEGATIVE: 1
CONSTITUTIONAL NEGATIVE: 1
NEUROLOGICAL NEGATIVE: 1
RESPIRATORY NEGATIVE: 1

## 2024-09-10 NOTE — PROGRESS NOTES
[facility] Department of Urogynecology   Trini Short MD, MPH   [phone]    ASSESSMENT AND PLAN:   69 year old female with OAB/UUI, vaginal atrophy, vulvar irritation, and anxiety. Comorbidities include: Personal hx of ER+ breast cancer s/p bilateral mastectomy with adjuvant chemotherapy completed in 2023 on aromatase inhibitor therapy and JL.     Diagnoses:  #1 Overactive bladder  #2 Personal history of breast cancer  #3 Anxiety   #4 Vulvar irritation     Plan:  1. OAB, UUI  - POCT UA negative.   - We discussed OAB lifestyle changes (i.e., trying to limit fluids to 60oz in total per day, timed voiding every 2-3 hours, stop drinking fluids 3 hours prior to bedtime, and avoiding/limiting bladder irritants such as caffeine, nicotine, artificial sweeteners, acidic/spicy foods, and alcohol).  - Discussed OAB treatment options such as lifestyle changes, PFPT, medications, PTNS, intradetrusor Botox injections, or SNM.   - We discussed that PFPT may help with bladder/pelvic floor restrengthening exercises with an overall goal to better control the bladder and improve urinary symptoms. PFPT includes attending sessions with a specialized licensed female pelvic floor physical therapist who does external with internal vaginal work to ensure she is doing the correct exercises to obtain the most benefit of physical therapy. We reviewed the importance of continuing these home exercises to receive maximum benefit from PFPT. They also teach mind over bladder strategies/urge suppression techniques to reduce the intensity of the urge to void.   - We discussed that with starting an OAB medication the goal would be to allow the detrusor muscle around the bladder to relax and prevent the muscles from spasm/squeezing too frequently to allow the bladder to store more urine which reduces the urge, frequency, and incontinent episodes.   - She is amenable to pursuing lifestyle changes, PFPT, and starting an OAB medication  (Myrbetriq).  - PFPT requisition sent today and gave her the list of local physical therapists with their corresponding locations/contact information.  - Sent Rx of Myrbetriq ER 25mg to her preferred pharmacy with instructions to take 1 pill by mouth daily. Discussed the drug profile and possible medication side effects including slightly elevated blood pressure. Encouraged her to routinely monitor her blood pressure at home when she starts to take this medication. If her blood pressure is elevated after starting Myrbetriq we advised her to discontinue taking the Myrbetriq and to call our office. This medication may take up to 2-4 weeks to reach full effect.    2. Anxiety  - Referral placed to OBGYN behavioral health therapist to help manage anxiety.     3. Vulvar irritation  - Upon vulvar exam it demonstrated chronic itch scratch over the right labia and groin.   - We reviewed IUGA vulvar care measures such as:  > Avoiding washing the vulva with soap but rather rinse with warm water by a handheld shower head, Mojgan-Bottle, or doing a Sitz bath, pat dry and then applying a barrier ointment such as Vaseline or Aquaphor to help protect the vulvar skin from irritation.   > Avoid wearing pads at home. If she has to wear pads we encouraged her to wear 100% cotton washable pads or to use a piece of flannel to place between the vulva and pad to prevent contact dermatitis from the pads.  > Try to wear loose pants and to sleep without underwear at night to prevent vulvar irritation from worsening.   > When she washes her underwear we advised her not to launder them with dryer sheets or fabric softeners as these can cause further vulvar irritation. She may try rinsing her underwear in a double rinse cycle to remove any excess soap.   > We strongly encouraged her to try not to scratch because this reinforces the itch-scratch cycle (I.e. the more she scratches her vulva the more she will itch).   - Gave her PI handout on vulvar  care measures to reduce itching and discomfort.   - consider vaginal moisturizers in the future    Follow up in 4 weeks with Dr. Trini Short for an OAB medication check.     Scribe Attestation  By signing my name below, I, Cam Elaine, attest that this documentation has been prepared under the direction and in the presence of Trini Short MD MPH on 09/10/2024 at 7:23 PM.     Problem List Items Addressed This Visit          Hematology and Neoplasia    Malignant neoplasm of breast (Multi)    Encounter for monitoring aromatase inhibitor therapy    History of bilateral mastectomy     Other Visit Diagnoses       OAB (overactive bladder)    -  Primary    Relevant Medications    mirabegron (Myrbetriq) 25 mg tablet extended release 24 hr 24 hr tablet    Other Relevant Orders    Referral to Physical Therapy    Functional urinary incontinence        Relevant Orders    POCT UA Automated manually resulted (Completed)    Anxiety        Relevant Orders    Referral to Ob-Gyn Behavioral Health           I spent a total of 60 minutes in face to face and non face to face time.     I Dr. Short, personally performed the services described in the documentation as scribed in my presence and confirm it is both complete and accurate.  Trini Short MD, MPH, FACOG       Trini Short MD, MPH, FACOG     New    HISTORY OF PRESENT ILLNESS:   69 year old female presenting as a new patient referral from [facility] Smith for evaluation of urinary incontinence.     Prolapse Symptoms:  - She reports feeling a vaginal bulge.      Urinary Symptoms:   - She reports experiencing urge urinary incontinence and nocturia throughout the night.   - Does have UUI on her way to the bathroom at night that has been an ongoing issue for the past x2 years and she wears pads for this. The patient notes worsening UUI after chemotherapy to tx HER-2 positive breast cancer.   - Denies GERRY with coughing, laughing, and sneezing.   - Voids  infrequently around 3-4x throughout the day; her mobility and pain has improved after her knee and hip replacement.   - Nocturia 0-4x per night and this is largely dependent upon her fluid intake.   - Drinks some coffee, occasional kombucha/chocolate milk, and primarily water throughout the day.   - No hx of Morales; she had one incidentally found UTI that was asymptomatic with no LUTS before her hip surgery.   - Denies feeling of incomplete bladder emptying.     Bowel Symptoms:   - She has a bowel movement 3x/day and her stool is soft/easy to pass.   - No diarrhea or loose stools.   - Denies constipation, pushing/straining to have a BM, or having a hard stool consistency (I.e. Carbon scale #1-2 stools).   - No FI or ABL episodes at baseline.     OBGYN History and Sexual Activity:   - , x2   - Weight of largest baby: 10 pounds  - Prior laparoscopic total hysterectomy due to endometrial hyperplasia.  - She is not sexually active due to her ongoing health issues and would like to maintain the ability to be sexually active in the future.       Past Medical History:     Past Medical History:   Diagnosis Date    Malignant neoplasm of unspecified site of unspecified female breast (Multi) 2021    Adenocarcinoma of breast    Pain in unspecified knee 2021    Knee pain       Past Surgical History:     Past Surgical History:   Procedure Laterality Date    BREAST LUMPECTOMY  2021    Left Breast Lumpectomy    KNEE SURGERY  2014    Knee Surgery    OTHER SURGICAL HISTORY  2021    Left Breast Reconstruction With Latissimus Dorsi Flap    OTHER SURGICAL HISTORY  2021    Modified Radical Mastectomy Left Breast    OTHER SURGICAL HISTORY  2021    Radiation Therapy    TOTAL KNEE ARTHROPLASTY  2021    Knee Replacement       Medications:     Prior to Admission medications    Medication Sig Start Date End Date Taking? Authorizing Provider   ASCORBIC ACID, VITAMIN C, ORAL Take 1 tablet  "by mouth once daily.    Historical Provider, MD   calcium carbonate-vitamin D3 500 mg-5 mcg (200 unit) powder in packet Take 500 mg by mouth once daily.    Historical Provider, MD   cholecalciferol (Vitamin D-3) 125 MCG (5000 UT) capsule Take 1 capsule (125 mcg) by mouth once daily.    Historical Provider, MD   cyanocobalamin, vitamin B-12, 5,000 mcg tablet, sublingual Place 1 tablet under the tongue once daily.    Historical Provider, MD   diclofenac (Voltaren) 75 mg EC tablet Take 1 tablet (75 mg) by mouth 2 times daily (morning and late afternoon). 8/7/23   Historical Provider, MD   exemestane (Aromasin) 25 mg tablet Take 1 tablet (25 mg total) by mouth once daily. 6/20/24   FERN Bray-CNP   lidocaine-prilocaine (Emla) 2.5-2.5 % cream Apply 1 Application topically 1 time if needed for moderate pain (4 - 6) or mild pain (1 - 3). 12/3/21   Historical Provider, MD   mirabegron (Myrbetriq) 25 mg tablet extended release 24 hr 24 hr tablet Take 1 tablet (25 mg) by mouth once daily. 9/10/24   Trini Short MD MPH   multivitamin tablet Take 1 tablet by mouth once daily.    Historical Provider, MD   QUERCETIN ORAL Take 1 tablet by mouth once daily.    Historical Provider, MD   vitamin E 180 mg (400 unit) capsule Take 1 capsule (400 Units) by mouth once daily.    Historical Provider, MD   zinc sulfate 66 mg tablet Take 1 tablet by mouth once daily.    Historical Provider, MD        ROS  Review of Systems   Constitutional: Negative.    HENT: Negative.     Eyes: Negative.    Respiratory: Negative.     Cardiovascular: Negative.    Gastrointestinal: Negative.    Endocrine: Negative.    Genitourinary:  Positive for enuresis and urgency.   Musculoskeletal: Negative.    Neurological: Negative.    Psychiatric/Behavioral: Negative.            PHYSICAL EXAM:    /76   Ht 1.727 m (5' 8\")   Wt 141 kg (311 lb)   LMP  (Exact Date)   BMI 47.29 kg/m²   No LMP recorded (exact date). Patient is " postmenopausal.    Declines chaperone for physical exam.      Well developed, well nourished, in no apparent distress.   Neurologic/Psychiatric:  Awake, Alert and Oriented times 3.  Affect normal.     GENITAL/URINARY:     External Genitalia:  The patient has normal appearing external genitalia, normal skenes and bartholins glands, and a normal hair distribution.  Her vulva is without lesions, erythema or discharge.  It is non-tender with appropriate sensation. Right labia and groin consistent with prior itching with excoriations.     Urethral Meatus:  Size normal, Location normal, Lesions absent, Prolapse absent.    Urethra:  Fullness absent, Masses absent. Negative CST in the supine position.     Bladder:  Fullness absent, Masses absent, Tenderness absent.    Vagina:  General appearance normal, Discharge absent, Lesions absent. Normal vaginal epithelium, hypoestrogenic.     Cervix: surgically absent  Uterus:  surgically absent    Anus/Perineum:  Lesions absent and masses absent. Normal anus, intact perineum.      Stress urinary incontinence not demonstrable.       Physical Exam  Genitourinary:      Vulva, bladder and urethral meatus normal.      Vaginal cuff intact.     Mild vaginal atrophy present.     Cervix is absent.      Uterus is absent.      No urethral tenderness or mass present.      Bladder is not tender.    POP-Q measurements were:      Aa: -1, Ba: -1, C: -8     gH: 5, pB: 4, TVL: 10     Ap: -2, Bp: -2, D: X     Pelvic exam was performed with patient in the lithotomy position and normal support.       She does not have myofascial tenderness on exam.   Her highest pain score on exam is 1       Rectal exam: Deferred.       Data and DIAGNOSTIC STUDIES REVIEWED   No results found.   Lab Results   Component Value Date    URINECULTURE Normal genitourinary damián 05/09/2024      Lab Results   Component Value Date    GLUCOSE 83 05/09/2024    CALCIUM 9.8 05/09/2024     05/09/2024    K 4.1 05/09/2024    CO2  28 05/09/2024     05/09/2024    BUN 20 05/09/2024    CREATININE 0.75 05/09/2024     Lab Results   Component Value Date    WBC 3.9 (L) 04/29/2024    HGB 14.1 04/29/2024    HCT 42.9 04/29/2024    MCV 99 04/29/2024     04/29/2024

## 2024-09-13 DIAGNOSIS — E66.01 MORBID OBESITY DUE TO EXCESS CALORIES (MULTI): ICD-10-CM

## 2024-09-17 ENCOUNTER — PATIENT OUTREACH (OUTPATIENT)
Dept: ENDOCRINOLOGY | Facility: CLINIC | Age: 69
End: 2024-09-17
Payer: MEDICARE

## 2024-09-18 ENCOUNTER — PATIENT OUTREACH (OUTPATIENT)
Dept: ENDOCRINOLOGY | Facility: CLINIC | Age: 69
End: 2024-09-18
Payer: MEDICARE

## 2024-09-18 ENCOUNTER — HOSPITAL ENCOUNTER (OUTPATIENT)
Dept: RADIOLOGY | Facility: CLINIC | Age: 69
Discharge: HOME | End: 2024-09-18
Payer: MEDICARE

## 2024-09-18 DIAGNOSIS — Z79.811 LONG TERM (CURRENT) USE OF AROMATASE INHIBITORS: ICD-10-CM

## 2024-09-18 DIAGNOSIS — C50.912 MALIGNANT NEOPLASM OF LEFT BREAST IN FEMALE, ESTROGEN RECEPTOR POSITIVE, UNSPECIFIED SITE OF BREAST: ICD-10-CM

## 2024-09-18 DIAGNOSIS — Z17.0 MALIGNANT NEOPLASM OF LEFT BREAST IN FEMALE, ESTROGEN RECEPTOR POSITIVE, UNSPECIFIED SITE OF BREAST: ICD-10-CM

## 2024-09-18 PROCEDURE — 77080 DXA BONE DENSITY AXIAL: CPT | Performed by: RADIOLOGY

## 2024-09-18 PROCEDURE — 77080 DXA BONE DENSITY AXIAL: CPT

## 2024-09-26 ENCOUNTER — TELEPHONE (OUTPATIENT)
Dept: OBSTETRICS AND GYNECOLOGY | Facility: CLINIC | Age: 69
End: 2024-09-26
Payer: MEDICARE

## 2024-09-26 DIAGNOSIS — N32.81 OAB (OVERACTIVE BLADDER): ICD-10-CM

## 2024-09-26 RX ORDER — MIRABEGRON 25 MG/1
25 TABLET, FILM COATED, EXTENDED RELEASE ORAL DAILY
Qty: 30 TABLET | Refills: 2 | Status: SHIPPED | OUTPATIENT
Start: 2024-09-26

## 2024-09-26 NOTE — TELEPHONE ENCOUNTER
Insurance denied [place] for generic mirabegron. Discussed with Dr Short and will change to brand, pended.

## 2024-10-04 ENCOUNTER — APPOINTMENT (OUTPATIENT)
Dept: ENDOCRINOLOGY | Facility: CLINIC | Age: 69
End: 2024-10-04
Payer: MEDICARE

## 2024-10-04 VITALS
BODY MASS INDEX: 44.41 KG/M2 | DIASTOLIC BLOOD PRESSURE: 72 MMHG | HEIGHT: 68 IN | WEIGHT: 293 LBS | SYSTOLIC BLOOD PRESSURE: 116 MMHG

## 2024-10-04 DIAGNOSIS — Z51.81 ENCOUNTER FOR MONITORING AROMATASE INHIBITOR THERAPY: ICD-10-CM

## 2024-10-04 DIAGNOSIS — C50.911 MALIGNANT NEOPLASM OF RIGHT BREAST IN FEMALE, ESTROGEN RECEPTOR POSITIVE, UNSPECIFIED SITE OF BREAST: ICD-10-CM

## 2024-10-04 DIAGNOSIS — E78.5 HYPERLIPIDEMIA, UNSPECIFIED HYPERLIPIDEMIA TYPE: ICD-10-CM

## 2024-10-04 DIAGNOSIS — Z79.811 ENCOUNTER FOR MONITORING AROMATASE INHIBITOR THERAPY: ICD-10-CM

## 2024-10-04 DIAGNOSIS — Z90.13 HISTORY OF BILATERAL MASTECTOMY: ICD-10-CM

## 2024-10-04 DIAGNOSIS — Z17.0 MALIGNANT NEOPLASM OF RIGHT BREAST IN FEMALE, ESTROGEN RECEPTOR POSITIVE, UNSPECIFIED SITE OF BREAST: ICD-10-CM

## 2024-10-04 DIAGNOSIS — M19.90 OSTEOARTHRITIS, UNSPECIFIED OSTEOARTHRITIS TYPE, UNSPECIFIED SITE: ICD-10-CM

## 2024-10-04 DIAGNOSIS — R39.81 FUNCTIONAL URINARY INCONTINENCE: ICD-10-CM

## 2024-10-04 DIAGNOSIS — E88.810 DYSMETABOLIC SYNDROME: Primary | ICD-10-CM

## 2024-10-04 PROCEDURE — 3008F BODY MASS INDEX DOCD: CPT | Performed by: INTERNAL MEDICINE

## 2024-10-04 PROCEDURE — 99205 OFFICE O/P NEW HI 60 MIN: CPT | Performed by: INTERNAL MEDICINE

## 2024-10-04 PROCEDURE — 1159F MED LIST DOCD IN RCRD: CPT | Performed by: INTERNAL MEDICINE

## 2024-10-04 RX ORDER — POTASSIUM CHLORIDE 750 MG/1
10 TABLET, FILM COATED, EXTENDED RELEASE ORAL 2 TIMES DAILY
Qty: 60 TABLET | Refills: 5 | Status: SHIPPED | OUTPATIENT
Start: 2024-10-04 | End: 2025-10-04

## 2024-10-04 NOTE — PROGRESS NOTES
Patient ID: Kim Henley is a 69 y.o. female who presents for New Patient Visit (Endocrine consult. Referred by Rosibel Porter for PSMF, weight loss.).  HPI  The patient is referred for evaluation for PSMF.    This is a 69-year-old female whose had a problem with weight her whole life and especially since moving from [place] to [place] 30 years ago.    She has been on numerous approaches with most success with FA.    Weight is complicated by insulin resistance edema joint pain hyperlipidemia.    She skips meals but tries to eat healthy.    She is just started back to exercising after having a hip replacement.    She has a past history of recurrent breast CA knee replacement.  She has never had gout kidney stones or gallstones.    Socially she is  retired non-smoker drinks alcohol rarely.    Family history negative for diabetes or thyroid.    ROS  Comprehensive review of systems is negative.    Objective   Physical Exam  Visit Vitals  /72      Vitals:    10/04/24 1541   Weight: 141 kg (310 lb)      Body mass index is 47.14 kg/m².      Alert and oriented x3  In no distress  No focal neurologic deficits  No supraclavicular, or dorsal fat  No purple striae  Integument intact  Moderate acanthosis  Eyes normal  ENT normal. No adenopathy  Thyroid palpable and normal. No nodules  Chest clear to auscultation  Heart sounds are normal  Abdomen nontender. Bowel sounds normal. No organomegaly  Feet are okay  Reflexes normal with normal return    Current Outpatient Medications   Medication Sig Dispense Refill    ASCORBIC ACID, VITAMIN C, ORAL Take 1 tablet by mouth once daily.      calcium carbonate-vitamin D3 500 mg-5 mcg (200 unit) powder in packet Take 500 mg by mouth once daily.      cholecalciferol (Vitamin D-3) 125 MCG (5000 UT) capsule Take 1 capsule (125 mcg) by mouth once daily.      diclofenac (Voltaren) 75 mg EC tablet Take 1 tablet (75 mg) by mouth 2 times daily (morning and late afternoon).      exemestane  (Aromasin) 25 mg tablet Take 1 tablet (25 mg total) by mouth once daily. 30 tablet 5    lidocaine-prilocaine (Emla) 2.5-2.5 % cream Apply 1 Application topically 1 time if needed for moderate pain (4 - 6) or mild pain (1 - 3).      multivitamin tablet Take 1 tablet by mouth once daily.      QUERCETIN ORAL Take 1 tablet by mouth once daily.      vitamin E 180 mg (400 unit) capsule Take 1 capsule (400 Units) by mouth once daily.      zinc sulfate 66 mg tablet Take 1 tablet by mouth once daily.      cyanocobalamin, vitamin B-12, 5,000 mcg tablet, sublingual Place 1 tablet under the tongue once daily.      Myrbetriq 25 mg tablet extended release 24 hr 24 hr tablet Take 1 tablet (25 mg) by mouth once daily. 30 tablet 2     No current facility-administered medications for this visit.       Assessment/Plan     1.  Insulin resistance  2.  Hyperlipidemia  3.  DJD  4.  Edema  5.  Breast CA    We discussed insulin resistance its pathophysiology and its impact.    We discussed risks, benefits and alternatives to the protein sparing modified fast.    We discussed risks, including, but not limited to the potential for electrolyte imbalance which could lead to cardiac arrhythmia.  The high protein nature of the diet increasing levels of uric acid which could lead to nephrolithiasis or gout.  The low fiber nature of the diet increasing risk for constipation.  Risk for gallstones, cold intolerance, excess skin seen with significant weight loss.      We discussed the benefits of weight loss regarding co-morbid conditions.      We also discussed alternatives to the program, including a balanced calorie restricted approach coupled with exercise and she would like to proceed.  We'll therefore set her up with the nutritionist for dietary instruction. When she starts the program, we'll add in potassium, as well as the other supplements and keep a close eye on electrolytes.    She'll follow up with me in 2 months, sooner as needed.    I  spent 60 minutes with this patient.  Greater than 50% of this time was spent in counseling and/or coordination of care.

## 2024-10-08 ENCOUNTER — APPOINTMENT (OUTPATIENT)
Dept: OBSTETRICS AND GYNECOLOGY | Facility: CLINIC | Age: 69
End: 2024-10-08
Payer: MEDICARE

## 2024-10-14 ENCOUNTER — APPOINTMENT (OUTPATIENT)
Dept: ENDOCRINOLOGY | Facility: CLINIC | Age: 69
End: 2024-10-14
Payer: MEDICARE

## 2024-10-14 DIAGNOSIS — E66.01 MORBID OBESITY DUE TO EXCESS CALORIES (MULTI): ICD-10-CM

## 2024-11-07 ENCOUNTER — EVALUATION (OUTPATIENT)
Dept: PHYSICAL THERAPY | Facility: CLINIC | Age: 69
End: 2024-11-07
Payer: MEDICARE

## 2024-11-07 DIAGNOSIS — M62.81 MUSCLE WEAKNESS: Primary | ICD-10-CM

## 2024-11-07 DIAGNOSIS — N32.81 OAB (OVERACTIVE BLADDER): ICD-10-CM

## 2024-11-07 DIAGNOSIS — Z91.81 AT MODERATE RISK FOR FALL: ICD-10-CM

## 2024-11-07 PROCEDURE — 97163 PT EVAL HIGH COMPLEX 45 MIN: CPT | Mod: GP | Performed by: PHYSICAL THERAPIST

## 2024-11-07 PROCEDURE — 97535 SELF CARE MNGMENT TRAINING: CPT | Mod: GP | Performed by: PHYSICAL THERAPIST

## 2024-11-07 ASSESSMENT — ENCOUNTER SYMPTOMS
OCCASIONAL FEELINGS OF UNSTEADINESS: 1
LOSS OF SENSATION IN FEET: 0
DEPRESSION: 0

## 2024-11-07 NOTE — PROGRESS NOTES
Physical Therapy  Physical Therapy Pelvic Floor Evaluation    Name: Kim Henley  MRN: [id number]  : 1955  Encounter Date: 2024  Visit Count: 1     Time Calculation  Start Time: 0900  Stop Time: 1015  Time Calculation (min): 75 min    Insurance: [employer], AUTH NEEDED  Visit # 1 of MN for   AUTH: 1 of ??? Visits from ??? To ???    Current Problem:  1. Muscle weakness  Follow Up In Physical Therapy      2. OAB (overactive bladder)  Referral to Physical Therapy    Follow Up In Physical Therapy      3. At moderate risk for fall  Follow Up In Physical Therapy          General  Reason for Referral: OAB  Referred By: Han CAI Fall Risk Score (The score of 4 or more indicates an increased risk of falling): 7  Precautions Comment: Comorbidities include: Personal hx of ER+ breast cancer s/p bilateral mastectomy with adjuvant chemotherapy completed in  on aromatase inhibitor therapy and JL  Vital Signs     Pain       Subjective  Chief Complaint: OAB/UUI, vaginal atrophy, vulvar irritation  - urinary incontinence: medication & PFPT   UUI during the daytime: waking up on couch - extreme urge, also happens at night  - sleep: disruptive due to hot sweats, snoring of , urination  - L knee  + R ARETHA May 2024  - : total hysterectomy - left 1 ovary  - trying to loose weight: insulin resistant  - noticing lower body swelling  - working with RD for nutrition and weight management  Onset: years ()  MIHAELA: unknown    Current Condition: same    PAIN  Intensity (0-10): 3  Location: L knee  Description: aching/sharp    Aggravating Factors: getting up and moving after sleeping or lying    Relevant Information (PMH & Previous Tests/Imaging): see chart  Previous Interventions/Treatments: NA    Prior Level of Function (PLOF)  Exercise/Physical Activity: under desk (walking pad & pedals)  Work/School: retired  (sitting)    Treatment Goals: learn how to strengthen the  pelvic floor    Cultural or Spiritual Practices: YazidismPastor's Wife  History of Trauma: no  Safe at Home: yes    OB/GYN HISTORY:   Pregnancies (): 2   Births (Para): 2  Vaginal = 2 - tearing and stitches  Difficult Labor? Long labor and big babies  Prolapse? No    Painful [place] or vaginal penetration? No    BLADDER  Frequency of Urination  Daytime:  2+ hours holding when in the zone, every hour  Nighttime: x2-4+   Screening = Recurrent infections/UTIs? Right before hip replacement sx  Other Symptoms   Trouble initiating urine stream    Urine stream is intermittent or slow    Trouble emptying bladder completely    Dribbling after urination    Straining or pushing to empty    Trouble feeling bladder urge/fullness   X Trouble holding urine when you get an urge   Urinary Incontinence/Leakage  Frequency (day/week/month)? When standing or waiting a long time  Present with cough and/or sneeze? No  Present with physical activity and/or exertion? Stand up from sitting & urgency  How much urine do you leak? Dribble and sometimes complete loss  Do you wear any barriers, such as pantishields or pads? Yes  How many are required in 24 hours? Several  Average Fluid Intake (glasses/day): 64+ fl oz water or more    BOWEL  Frequency of Bowel Movements: regular, multiple times per day  Average Fiber Intake (per day): yes, veggies      Objective  Patient was educated on pelvic floor anatomy, function, and dysfunction.   Patient was educated on an orthopedic assessment & external pelvic floor assessment technique and verbalized consent.   The patient is aware they have full autonomy and can stop the assessment at any time.     Standing Assessment:   Posture: increased spinal curves  SL Stance: <10sec bilaterally  DL Squat: decreased depth, requires bi UE support for sit to stand  SL Squat: unable  Gait: compensated trendelenburg bilaterally, decreased gait speed, decreased hip extension    OUTCOMES MEASURE:  Shane  Pelvic Dysfunction Screening Tool : positive    NIH-CPSI  -Pain: 0  - Urinary Symp: 9  - QOL: 4    Goals:   Active       PT Problem       PT Goal 1       Start:  11/07/24    Expected End:  02/05/25       Patient will return to prior level of function with minimal to no pain and without limitations for participation in ADLs, health, and exercise.   Patient demonstrate home exercise program adherence to supplement symptom reduction and functional gains made in clinic  Patient will reports minimal to no pain for participation in ADLs and return to PLOF.   Patient will demonstrate coordination of pelvic floor, strength & relaxation wnl for return to ADLs and PLOF without restriction.   Pt will demonstrate improvement on the outcome measure score to demonstrate overall improved functional abilities and quality of life.              Education: home exercise program, plan of care, activity modifications, pain management, and injury pathology  Bladder Habits: Just in Case Pee, Hover over the toilet, relax & breathe, squatty potty use  Hydration Recommendation: 50% of your body wt (pounds) in fluid ounces  Bladder Irritants: caffeine, artificial sweeteners, carbonated beverages, alcohol  Fiber Intake Recommendation: 25-30g/day    Treatments: education: home exercise program, plan of care, activity modifications, pain management, and injury pathology  - Vulvar Care  - OAB symptoms vs healthy bladder  - fluid intake & hydration  - bladder irritants    - recumbent bike  - compression stockings during the day  - elevate legs mid-day above heart for 30 min    Plan for Next Visit:   - bladder triggers and urge suppression  - aamir - discuss possible future intravaginal assessment  - core/PF/hip strengthening  - balance and LE strength    Assessment: Patient presents with signs and symptoms consistent with overactive bladder, muscle weakness, fall risk, resulting in limited participation in pain-free ADLs and inability to perform  at their prior level of function. Pt would benefit from physical therapy to address the impairments found & listed previously in the objective section in order to return to safe and pain-free ADLs and prior level of function.    Plan:   Planned Interventions include: therapeutic exercise, self-care home management, manual therapy, therapeutic activities, gait training, neuromuscular coordination, aquatic therapy  Patient and/or family understands and agrees with goals and plan documented: yes  Frequency: 2x/month  Duration: 2-4 months     Belinda Keenan, PT

## 2024-11-11 ENCOUNTER — OFFICE VISIT (OUTPATIENT)
Dept: PRIMARY CARE | Facility: CLINIC | Age: 69
End: 2024-11-11
Payer: MEDICARE

## 2024-11-11 VITALS
OXYGEN SATURATION: 99 % | TEMPERATURE: 97.2 F | HEART RATE: 91 BPM | DIASTOLIC BLOOD PRESSURE: 82 MMHG | WEIGHT: 293 LBS | SYSTOLIC BLOOD PRESSURE: 118 MMHG | BODY MASS INDEX: 44.41 KG/M2 | RESPIRATION RATE: 16 BRPM | HEIGHT: 68 IN

## 2024-11-11 DIAGNOSIS — E66.01 MORBID OBESITY DUE TO EXCESS CALORIES (MULTI): ICD-10-CM

## 2024-11-11 DIAGNOSIS — R06.02 SHORTNESS OF BREATH: Primary | ICD-10-CM

## 2024-11-11 DIAGNOSIS — E88.810 DYSMETABOLIC SYNDROME: ICD-10-CM

## 2024-11-11 PROCEDURE — 1160F RVW MEDS BY RX/DR IN RCRD: CPT | Performed by: INTERNAL MEDICINE

## 2024-11-11 PROCEDURE — 99214 OFFICE O/P EST MOD 30 MIN: CPT | Performed by: INTERNAL MEDICINE

## 2024-11-11 PROCEDURE — 3008F BODY MASS INDEX DOCD: CPT | Performed by: INTERNAL MEDICINE

## 2024-11-11 PROCEDURE — 1125F AMNT PAIN NOTED PAIN PRSNT: CPT | Performed by: INTERNAL MEDICINE

## 2024-11-11 PROCEDURE — 1159F MED LIST DOCD IN RCRD: CPT | Performed by: INTERNAL MEDICINE

## 2024-11-11 SDOH — ECONOMIC STABILITY: FOOD INSECURITY: WITHIN THE PAST 12 MONTHS, YOU WORRIED THAT YOUR FOOD WOULD RUN OUT BEFORE YOU GOT MONEY TO BUY MORE.: NEVER TRUE

## 2024-11-11 SDOH — ECONOMIC STABILITY: FOOD INSECURITY: WITHIN THE PAST 12 MONTHS, THE FOOD YOU BOUGHT JUST DIDN'T LAST AND YOU DIDN'T HAVE MONEY TO GET MORE.: NEVER TRUE

## 2024-11-11 ASSESSMENT — LIFESTYLE VARIABLES: HOW MANY STANDARD DRINKS CONTAINING ALCOHOL DO YOU HAVE ON A TYPICAL DAY: PATIENT DOES NOT DRINK

## 2024-11-11 ASSESSMENT — PAIN SCALES - GENERAL: PAINLEVEL_OUTOF10: 5

## 2024-11-11 ASSESSMENT — PATIENT HEALTH QUESTIONNAIRE - PHQ9
SUM OF ALL RESPONSES TO PHQ9 QUESTIONS 1 AND 2: 0
1. LITTLE INTEREST OR PLEASURE IN DOING THINGS: NOT AT ALL
2. FEELING DOWN, DEPRESSED OR HOPELESS: NOT AT ALL

## 2024-11-11 ASSESSMENT — ENCOUNTER SYMPTOMS
DEPRESSION: 0
LOSS OF SENSATION IN FEET: 0
OCCASIONAL FEELINGS OF UNSTEADINESS: 0

## 2024-11-11 NOTE — PROGRESS NOTES
"Subjective   Patient ID: Kim Henley is a 69 y.o. female who presents for Follow-up.    Complains of shortness of breath when trying to walk more now and when walking from car to buildings on and off for about 5 months now, ever since her hip surgery.Feet are swelling on and off but wears compression socks. No associated chest pain. Weight is up 27 pounds since surgery. Holguin tried several different diets.         Review of Systems   Constitutional:  Negative for chills and fever.   Respiratory:  Negative for shortness of breath.    Cardiovascular:  Negative for chest pain.   Gastrointestinal:  Negative for abdominal pain, nausea and vomiting.       Objective   /82   Pulse 91   Temp 36.2 °C (97.2 °F) (Temporal)   Resp 16   Ht 1.727 m (5' 8\")   Wt 144 kg (317 lb 12.8 oz)   SpO2 99%   BMI 48.32 kg/m²     Physical Exam  Gen appearance: well groomed in NAD  A & O: alert and oriented x 3  CV: RRR   Lungs: CTA bilaterally  Extr: compression socks on    Assessment/Plan   SOB: will check CXR. Needs f/up with Cards for periodic monitoring of heart function after chemo meds.  Morbid obesity: we discussed at length the role of weight, especially increasing, on the joints and affecting the breathing with ambulation.  Dysmetabolic syndrome: I referred you to our Clinical Pharm D program for help with weight management.  Greater than 30 minutes were spent in the care and coordination of care of the patient.       "

## 2024-11-12 PROBLEM — R06.02 SHORTNESS OF BREATH: Status: ACTIVE | Noted: 2024-11-12

## 2024-11-12 ASSESSMENT — ENCOUNTER SYMPTOMS
FEVER: 0
SHORTNESS OF BREATH: 0
CHILLS: 0
VOMITING: 0
ABDOMINAL PAIN: 0
NAUSEA: 0

## 2024-11-14 ENCOUNTER — APPOINTMENT (OUTPATIENT)
Dept: ENDOCRINOLOGY | Facility: CLINIC | Age: 69
End: 2024-11-14
Payer: MEDICARE

## 2024-11-18 ENCOUNTER — APPOINTMENT (OUTPATIENT)
Dept: PHARMACY | Facility: HOSPITAL | Age: 69
End: 2024-11-18
Payer: MEDICARE

## 2024-11-18 DIAGNOSIS — E88.810 DYSMETABOLIC SYNDROME: ICD-10-CM

## 2024-11-18 NOTE — ASSESSMENT & PLAN NOTE
Assessment:    Patient is classified as being in Class 3 obesity based on most recent BMI of 48.32 kg/m2 (from 11/11/24). Patient is interested in taking a medication for weight loss. Previous weight loss attempts include diet and exercise, Wegovy (maximum dose was 1mg subcutaneous weekly), and Diethyltrot 50mg PO BID. Of note, patient was able to afford Wegovy through a large discount while a weight loss center was managing her weight loss. Patient cannot afford Wegovy under other circumstances and also cannot afford Zepbound.    Plan:    - Will have patient re-enroll in weight loss center to be able to be able to receive Wegovy at discounted price and to always be able to afford Wegovy      98

## 2024-11-18 NOTE — PROGRESS NOTES
Patient is sent at the request of Federica Victoria MD for my opinion regarding medication assisted weight management. My final recommendations will be communicated back to the requesting provider by way of shared medical record.    Subjective     HPI    Patient is a 70 YO F with class 3 obesity as evidenced by their most recent in-office weight of 317 lbs and calculated BMI of 48.32 kg/m2. Patient states her target weight is 160-170 lbs.    Previous Weight Loss Attempts:   Wegovy (maximum dose was 1mg subcutaneous weekly)*  Diethyltrot 50mg PO BID   Diet and exercise    *Of note, patient was able to receive Wegovy at a very discounted price while attending a weight-loss center    Past Medical History:  She has a past medical history of Malignant neoplasm of unspecified site of unspecified female breast (11/30/2021) and Pain in unspecified knee (11/08/2021).    Past Surgical History:  She has a past surgical history that includes Knee surgery (06/06/2014); Total knee arthroplasty (11/08/2021); Breast lumpectomy (11/30/2021); Other surgical history (11/30/2021); Other surgical history (11/30/2021); and Other surgical history (11/30/2021).    Social History:  She reports that she has never smoked. She has never used smokeless tobacco. She reports that she does not drink alcohol and does not use drugs.    Family History:  No family history on file.    Allergies:  Egg    Current diet: patient reports not eating a lot and skipping meals frequently    Current exercise: none currently but will start walking on treadmill      Objective     Last Recorded Vitals:  BP Readings from Last 6 Encounters:   11/11/24 118/82   10/04/24 116/72   09/10/24 130/76   06/20/24 119/72   04/29/24 117/77   04/08/24 125/81        Wt Readings from Last 6 Encounters:   11/11/24 144 kg (317 lb 12.8 oz)   10/04/24 141 kg (310 lb)   09/10/24 141 kg (311 lb)   06/20/24 135 kg (297 lb 2.9 oz)   04/29/24 132 kg (291 lb 3.2 oz)   04/08/24 130 kg (287  "lb)       Weight Management Pharmacotherapy:  none     Historical Weight Management Pharmacotherapy:   Wegovy (maximum dose was 1mg subcutaneous weekly)**  Diethyltrot 50mg PO BID**    **last dose of med was in early May      Primary/Secondary Prevention   Statin? No  ACE-I/ARB? No  Aspirin? No        Lab Review  Lab Results   Component Value Date    BILITOT 0.4 04/29/2024    CALCIUM 9.8 05/09/2024    CO2 28 05/09/2024     05/09/2024    CREATININE 0.75 05/09/2024    GLUCOSE 83 05/09/2024    ALKPHOS 91 04/29/2024    K 4.1 05/09/2024    PROT 7.7 04/29/2024     05/09/2024    AST 33 04/29/2024    ALT 15 04/29/2024    BUN 20 05/09/2024    ANIONGAP 14 05/09/2024    ALBUMIN 4.3 04/29/2024    GFRF >90 12/01/2022     Lab Results   Component Value Date    TRIG 49 10/07/2022    CHOL 205 (H) 10/07/2022    HDL 61.5 10/07/2022     Lab Results   Component Value Date    HGBA1C 4.8 04/29/2024    HGBA1C 5.1 08/28/2023    HGBA1C 5.1 06/23/2021     No components found for: \"UACR\"  The 10-year ASCVD risk score (Liz VASQUEZ, et al., 2019) is: 8.7%    Values used to calculate the score:      Age: 69 years      Sex: Female      Is Non- : Yes      Diabetic: No      Tobacco smoker: No      Systolic Blood Pressure: 118 mmHg      Is BP treated: No      HDL Cholesterol: 61.5 mg/dL      Total Cholesterol: 205 mg/dL        Assessment/Plan   Problem List Items Addressed This Visit       Dysmetabolic syndrome     Assessment:    Patient is classified as being in Class 3 obesity based on most recent BMI of 48.32 kg/m2 (from 11/11/24). Patient is interested in taking a medication for weight loss. Previous weight loss attempts include diet and exercise, Wegovy (maximum dose was 1mg subcutaneous weekly), and Diethyltrot 50mg PO BID. Of note, patient was able to afford Wegovy through a large discount while a weight loss center was managing her weight loss. Patient cannot afford Wegovy under other circumstances and also " cannot afford Zepbound.    Plan:    - Will have patient re-enroll in weight loss center to be able to be able to receive Wegovy at discounted price and to always be able to afford Wegovy               Pharmacy Follow-Up: N/A, follow-up with clinical pharmacist not necessary at this time  PCP Follow-Up: not yet scheduled    Ge Morrow, PharmD     Continue all meds under the continuation of care with the referring provider and clinical pharmacy team.

## 2024-11-21 ENCOUNTER — APPOINTMENT (OUTPATIENT)
Dept: PHYSICAL THERAPY | Facility: CLINIC | Age: 69
End: 2024-11-21
Payer: MEDICARE

## 2024-12-09 ENCOUNTER — APPOINTMENT (OUTPATIENT)
Dept: ENDOCRINOLOGY | Facility: CLINIC | Age: 69
End: 2024-12-09
Payer: MEDICARE

## 2024-12-13 ENCOUNTER — APPOINTMENT (OUTPATIENT)
Dept: PHYSICAL THERAPY | Facility: CLINIC | Age: 69
End: 2024-12-13
Payer: MEDICARE

## 2024-12-13 ENCOUNTER — OFFICE VISIT (OUTPATIENT)
Dept: HEMATOLOGY/ONCOLOGY | Facility: CLINIC | Age: 69
End: 2024-12-13
Payer: MEDICARE

## 2024-12-13 VITALS
WEIGHT: 293 LBS | BODY MASS INDEX: 48.37 KG/M2 | TEMPERATURE: 97.9 F | DIASTOLIC BLOOD PRESSURE: 64 MMHG | SYSTOLIC BLOOD PRESSURE: 101 MMHG | HEART RATE: 95 BPM

## 2024-12-13 DIAGNOSIS — C50.912 MALIGNANT NEOPLASM OF LEFT BREAST IN FEMALE, ESTROGEN RECEPTOR POSITIVE, UNSPECIFIED SITE OF BREAST: ICD-10-CM

## 2024-12-13 DIAGNOSIS — Z51.81 ENCOUNTER FOR MONITORING AROMATASE INHIBITOR THERAPY: ICD-10-CM

## 2024-12-13 DIAGNOSIS — C50.911 MALIGNANT NEOPLASM OF RIGHT BREAST IN FEMALE, ESTROGEN RECEPTOR POSITIVE, UNSPECIFIED SITE OF BREAST: ICD-10-CM

## 2024-12-13 DIAGNOSIS — Z90.13 HISTORY OF BILATERAL MASTECTOMY: ICD-10-CM

## 2024-12-13 DIAGNOSIS — Z17.0 MALIGNANT NEOPLASM OF LEFT BREAST IN FEMALE, ESTROGEN RECEPTOR POSITIVE, UNSPECIFIED SITE OF BREAST: ICD-10-CM

## 2024-12-13 DIAGNOSIS — Z79.811 ENCOUNTER FOR MONITORING AROMATASE INHIBITOR THERAPY: ICD-10-CM

## 2024-12-13 DIAGNOSIS — R39.81 FUNCTIONAL URINARY INCONTINENCE: ICD-10-CM

## 2024-12-13 DIAGNOSIS — Z17.0 MALIGNANT NEOPLASM OF RIGHT BREAST IN FEMALE, ESTROGEN RECEPTOR POSITIVE, UNSPECIFIED SITE OF BREAST: ICD-10-CM

## 2024-12-13 PROCEDURE — 1126F AMNT PAIN NOTED NONE PRSNT: CPT | Performed by: NURSE PRACTITIONER

## 2024-12-13 PROCEDURE — 1159F MED LIST DOCD IN RCRD: CPT | Performed by: NURSE PRACTITIONER

## 2024-12-13 PROCEDURE — 99215 OFFICE O/P EST HI 40 MIN: CPT | Performed by: NURSE PRACTITIONER

## 2024-12-13 PROCEDURE — 1160F RVW MEDS BY RX/DR IN RCRD: CPT | Performed by: NURSE PRACTITIONER

## 2024-12-13 ASSESSMENT — PAIN SCALES - GENERAL: PAINLEVEL_OUTOF10: 0-NO PAIN

## 2024-12-13 NOTE — PATIENT INSTRUCTIONS
Please call us at [phone] option 5 then option 2 with any questions or concerns       1. Exercise 2.5 hours per week; bone strengthening, cardio-vascular, resistance training.  2. Please do self breast exams monthly.  3. Keep alcohol under 3 drinks per week.  4. Sun safety - limit sun exposure from 11a-2p when its at its hottest, apply 15-30 sun block and re-apply every 1-2 hours if perspiring or swimming.  5. Eat a plant based diet, add in oily fishes such as mackerel, tuna, and salmon.  6. Get in at least 1,000 mg of calcium per day through diet or supplement for bone strength. Examples of foods higher in calcium are milk, yogurt, fruited yogurt, oranges, fortified orange juice, almonds, almond milk, broccoli, spinach, bok jayla, mustard greens, puddings, custards, ice cream, fortified cereals, bars, and crackers.   7. Please continue on your anastrozole 1 mg daily  8. Please call the office if any new mass or rash in or around breast, or any uncontrolled symptoms that last over 2-3 weeks at [phone].  9. Exam was negative today  10. It was nice seeing you today, . I will see you back this time next year. Have a Happy and Healthy Holiday Season. Thank you for choosing [facility] with your care

## 2024-12-18 RX ORDER — EXEMESTANE 25 MG/1
25 TABLET ORAL
Qty: 90 TABLET | Refills: 3 | Status: SHIPPED | OUTPATIENT
Start: 2024-12-18

## 2024-12-18 NOTE — PROGRESS NOTES
Oncology Follow-Up    Kim Henley  [id number]              Breast         AJCC Edition: 8th (AJCC), Diagnosis Date: 23-Oct-2021, IA, pT1b pN0 cM0 G3        Treatment Synopsis:    67-year-old postmenopausal AA female with prior history of left-sided invasive ductal carcinoma, stage IA (T1b N0 M0).  Patient now has contralateral her2 positive  breast cancer with details as foThe patient's breast cancer was diagnosed on October 23, 2021, and is estrogen receptor positive, progesterone receptor negative, and HER-2/godwin positive.         CURRENT THERAPY: Aromasin 25 mg by mouth daily     Details of her history:   1987: Diagnosed with left breast cancer. Patient underwent a left lumpectomy with ALND and radiation   2008: Left breast recurrence. Patient underwent a left breast mastectomy with a Latissimus Dorsi Flap, Arimidex x 5 years   08/13/2021: Patient underwent a right mammogram and US. This showed 0.6 x 0.5 x 0.5 cm mass at 12:00, 1 cm from the nipple.    09/07/2021: Patient underwent an US guided core biopsy of right breast mass   10/28/2021: Patient underwent a right breast mastectomy.    12/03/2021: Received her first dose of Taxol Herceptin   03/03/2021: First dose maintenance Herceptin    12/01/2022: Received the last dose of  maintenance Trastuzumab   06/02/2023: Started Aromasin 25 mg by mouth daily      Omid Alvarez presents for her Oncology Follow Up Visit. She needs a right hip replacement. Her left knee was already replaced. She is using a cane with long distances.  reports right hand tingling and wears a brace. She saw dermatology recently.  reports urinary incontinence and is doing pelvic floor exercises. For the swelling in her legs she wears compression socks.  denies any unusual headaches, balance issues, depression, cough, shortness of breath, problems swallowing, changes in chest/breast area, abdominal pain, bone or muscle pain, vaginal bleeding, rectal bleeding, blood in the urine,  vaginal dryness, swelling arms or legs, new or unusual skin moles or lesions. She sees Dr. Victoria in primary care.    Objective      Vitals:    12/13/24 1433   BP: 101/64   Pulse: 95   Temp: 36.6 °C (97.9 °F)        Constitutional: Well developed, alert/oriented x3, no distress, cooperative   Eyes: clear sclera   ENMT: mucous membranes moist, no apparent lesions   Head/Neck: Neck supple, no bruits   Respiratory/Thorax: Patent airways, normal breath sounds with good chest expansion   Cardiovascular: Regular rate and rhythm, no murmurs, 2+ equal pulses of the extremities,   Gastrointestinal: Nondistended, soft, non-tender, no masses palpable, no organomegaly   Musculoskeletal: ROM intact, no joint swelling, normal strength   Extremities: normal extremities, no edema, cyanosis, contusions or wounds   Neurological: alert and oriented x3,  normal strength   Breast: Left mastectomy with   Lymphatic: No significant lymphadenopathy   Psychological: Appropriate mood and behavior   Skin: Warm and dry, no lesions, no rashes      Physical Exam  Chest:      Comments: Left breast + for breast conserving surgery with well healed circumareolar and left axillary incisions; no masses, nodules, skin changes, discharge.  Right breast without masses, nodules, skin changes, discharge. Both breasts are pendulous.       Lab Results   Component Value Date    WBC 3.9 (L) 04/29/2024    HGB 14.1 04/29/2024    HCT 42.9 04/29/2024    MCV 99 04/29/2024     04/29/2024       Chemistry    Lab Results   Component Value Date/Time     05/09/2024 1239    K 4.1 05/09/2024 1239     05/09/2024 1239    CO2 28 05/09/2024 1239    BUN 20 05/09/2024 1239    CREATININE 0.75 05/09/2024 1239    Lab Results   Component Value Date/Time    CALCIUM 9.8 05/09/2024 1239    ALKPHOS 91 04/29/2024 1409    AST 33 04/29/2024 1409    ALT 15 04/29/2024 1409    BILITOT 0.4 04/29/2024 1409         Imaging:    Status Exam Begun Exam Ended   Final 9/18/2024  15:32 9/18/2024 15:42     Study Result    Narrative & Impression   Interpreted By:  Clair Ibarra,   STUDY:  DEXA BONE DENSITY9/18/2024 3:42 pm      INDICATION:  Signs/Symptoms:Aromatase Inhibitor. The patient is a 68 y/o  year old  F.      ,C50.912 Malignant neoplasm of unspecified site of left female breast  (Multi),Z17.0 Estrogen receptor positive status (ER+),Z79.811 Long  term (current) use of aromatase inhibitors      COMPARISON:  None.      ACCESSION NUMBER(S):  [id number]      ORDERING CLINICIAN:  BENJAMIN BILLINGSLEY      TECHNIQUE:  DEXA BONE DENSITY      FINDINGS:  SPINE L1-L4  Bone Mineral Density: 1.215  T-Score 0.3  Z-Score 1.2          LEFT FEMUR -TOTAL  Bone Mineral Density: 0.831  T-Score -1.4   Z-Score  -1.0          LEFT FEMUR -NECK  Bone Mineral Density: 0.830  T-Score -1.5  Z-Score -0.8              World Health Organization (WHO) criteria for post-menopausal,   Women:  Normal:         T-score at or above -1 SD  Osteopenia:   T-score between -1 and -2.5 SD  Osteoporosis: T-score at or below -2.5 SD          10-year Fracture Risk:  Major Osteoporotic Fracture  3.0  Hip Fracture                        0.3          This exam was performed at [facility] on a [facility] Dexa Unit.      IMPRESSION:  DEXA:  According to World Health Organization criteria,  classification is low bone mass (osteopenia)      Followup recommended in two years or sooner as clinically warranted.      is a 70 yo woman with a hx of left breast cancer with recurrence originally diagnose din 1987 and right breast cancer diagnosed October 2021. She is s/p bilateral mastectomy, TH chemotherapy, and is currently on exemestane with good tolerance. There is no evidence of recurrent disease on today's exam.     Plan:  Exam is negative.  Continue exemestane 25mg daily.  Encouraged monthly breast self exams, plant based diet, keep alcohol <3 drinks/week, exercise at least 2.5 hours/week.  We reviewed  signs/symptoms of recurrence including new masses, new pigmented lesion, tugging or pulling of the skin, nipple discharge, rash in or around the chest area, or any new finding that doesn't resolve within a 2-3 weeks.  All of 's questions/concerns were addressed.  Over 25 minutes of time was spent with this patient with >50% of the time with education, counseling, and coordination of care.   See Dr. Victoria at least yearly in primary care.  I will see  back in June. She will call with any concerns.    Assessment/Plan    Diagnoses and all orders for this visit:  Malignant neoplasm of right breast in female, estrogen receptor positive, unspecified site of breast  -     Clinic Appointment Request Follow Up; Future  -     Clinic Appointment Request Follow up  Encounter for monitoring aromatase inhibitor therapy  -     Clinic Appointment Request Follow up  History of bilateral mastectomy  -     Clinic Appointment Request Follow up  Functional urinary incontinence  -     Clinic Appointment Request Follow up        Rosibel Porter, APRN-CNP

## 2025-01-02 ENCOUNTER — DOCUMENTATION (OUTPATIENT)
Dept: PHYSICAL THERAPY | Facility: CLINIC | Age: 70
End: 2025-01-02
Payer: MEDICARE

## 2025-01-02 NOTE — PROGRESS NOTES
Physical Therapy    Discharge Summary    Name: Kim Henley  MRN: [id number]  : 1955  Date: 25    Discharge Summary: PT    Discharge Information: Date of discharge 25    Rehab Discharge Reason: Failed to schedule and/or keep follow-up appointment(s)    Belinda Keenan, PT

## 2025-06-13 ENCOUNTER — APPOINTMENT (OUTPATIENT)
Dept: HEMATOLOGY/ONCOLOGY | Facility: CLINIC | Age: 70
End: 2025-06-13
Payer: MEDICARE

## 2025-06-27 ENCOUNTER — OFFICE VISIT (OUTPATIENT)
Dept: HEMATOLOGY/ONCOLOGY | Facility: CLINIC | Age: 70
End: 2025-06-27
Payer: MEDICARE

## 2025-06-27 VITALS
BODY MASS INDEX: 48.17 KG/M2 | DIASTOLIC BLOOD PRESSURE: 76 MMHG | SYSTOLIC BLOOD PRESSURE: 129 MMHG | OXYGEN SATURATION: 100 % | TEMPERATURE: 97.7 F | WEIGHT: 293 LBS | HEART RATE: 76 BPM

## 2025-06-27 DIAGNOSIS — Z92.21 HISTORY OF ANTINEOPLASTIC CHEMOTHERAPY: ICD-10-CM

## 2025-06-27 DIAGNOSIS — Z79.811 ENCOUNTER FOR MONITORING AROMATASE INHIBITOR THERAPY: Primary | ICD-10-CM

## 2025-06-27 DIAGNOSIS — Z17.0 MALIGNANT NEOPLASM OF RIGHT BREAST IN FEMALE, ESTROGEN RECEPTOR POSITIVE, UNSPECIFIED SITE OF BREAST: ICD-10-CM

## 2025-06-27 DIAGNOSIS — Z90.13 HISTORY OF BILATERAL MASTECTOMY: ICD-10-CM

## 2025-06-27 DIAGNOSIS — Z51.81 ENCOUNTER FOR MONITORING AROMATASE INHIBITOR THERAPY: Primary | ICD-10-CM

## 2025-06-27 DIAGNOSIS — Z85.3 ENCOUNTER FOR FOLLOW-UP SURVEILLANCE OF BREAST CANCER: ICD-10-CM

## 2025-06-27 DIAGNOSIS — Z08 ENCOUNTER FOR FOLLOW-UP SURVEILLANCE OF BREAST CANCER: ICD-10-CM

## 2025-06-27 DIAGNOSIS — C50.911 MALIGNANT NEOPLASM OF RIGHT BREAST IN FEMALE, ESTROGEN RECEPTOR POSITIVE, UNSPECIFIED SITE OF BREAST: ICD-10-CM

## 2025-06-27 PROCEDURE — 1159F MED LIST DOCD IN RCRD: CPT | Performed by: NURSE PRACTITIONER

## 2025-06-27 PROCEDURE — 1036F TOBACCO NON-USER: CPT | Performed by: NURSE PRACTITIONER

## 2025-06-27 PROCEDURE — 1160F RVW MEDS BY RX/DR IN RCRD: CPT | Performed by: NURSE PRACTITIONER

## 2025-06-27 PROCEDURE — 1126F AMNT PAIN NOTED NONE PRSNT: CPT | Performed by: NURSE PRACTITIONER

## 2025-06-27 PROCEDURE — 99215 OFFICE O/P EST HI 40 MIN: CPT | Performed by: NURSE PRACTITIONER

## 2025-06-27 ASSESSMENT — PAIN SCALES - GENERAL: PAINLEVEL_OUTOF10: 0-NO PAIN

## 2025-06-27 NOTE — PROGRESS NOTES
Oncology Follow-Up    Kim Henley  [id number]              Breast         AJCC Edition: 8th (AJCC), Diagnosis Date: 23-Oct-2021, IA, pT1b pN0 cM0 G3      Treatment Synopsis:    67-year-old postmenopausal AA female with prior history of left-sided invasive ductal carcinoma, stage IA (T1b N0 M0).  Patient now has contralateral her2 positive  breast cancer with details as foThe patient's breast cancer was diagnosed on October 23, 2021, and is estrogen receptor positive, progesterone receptor negative, and HER-2/godwin positive.         CURRENT THERAPY: Aromasin 25 mg by mouth daily     Details of her history:   1987: Diagnosed with left breast cancer. Patient underwent a left lumpectomy with ALND and radiation   2008: Left breast recurrence. Patient underwent a left breast mastectomy with a Latissimus Dorsi Flap, Arimidex x 5 years   08/13/2021: Patient underwent a right mammogram and US. This showed 0.6 x 0.5 x 0.5 cm mass at 12:00, 1 cm from the nipple.    09/07/2021: Patient underwent an US guided core biopsy of right breast mass   10/28/2021: Patient underwent a right breast mastectomy.    12/03/2021: Received her first dose of Taxol Herceptin   03/03/2021: First dose maintenance Herceptin    12/01/2022: Received the last dose of  maintenance Trastuzumab   06/02/2023: Started Aromasin 25 mg by mouth daily      Omid Alvarez presents for her Oncology Follow Up Visit. She had a right hip replacement. Her left knee if now painful. She was told she cannot have surgery unless urgent. She is going to [facility] for a second opinion. Otherwise,  states she feels good. She is losing weight and exercising.  rates her energy level as 7-8/10 and no distress. She continues on exemestane with good tolerance.  denies any unusual headaches, balance issues, depression, cough, shortness of breath, problems swallowing, changes in chest/breast area, abdominal pain, bone or muscle pain, vaginal bleeding, rectal bleeding, blood in the  urine, vaginal dryness, swelling arms or legs, new or unusual skin moles or lesions.         Objective      Vitals:    06/27/25 1135   BP: 129/76   Pulse: 76   Temp: 36.5 °C (97.7 °F)   SpO2: 100%        Constitutional: Well developed, alert/oriented x3, no distress, cooperative   Eyes: clear sclera   ENMT: mucous membranes moist, no apparent lesions   Head/Neck: Neck supple, no bruits   Respiratory/Thorax: Patent airways, normal breath sounds with good chest expansion   Cardiovascular: Regular rate and rhythm, no murmurs, 2+ equal pulses of the extremities,   Gastrointestinal: Nondistended, soft, non-tender, no masses palpable, no organomegaly   Musculoskeletal: ROM intact, no joint swelling, normal strength   Extremities: normal extremities, no edema, cyanosis, contusions or wounds   Neurological: alert and oriented x3,  normal strength   Breast:  Bilateral mastectomy with reconstruction; no masses, nodules, skin changes, discharge, well healed incisions bilaterally.    Lymphatic: No significant lymphadenopathy   Psychological: Appropriate mood and behavior   Skin: Warm and dry, no lesions, no rashes      Physical Exam     Lab Results   Component Value Date    WBC 3.9 (L) 04/29/2024    HGB 14.1 04/29/2024    HCT 42.9 04/29/2024    MCV 99 04/29/2024     04/29/2024       Chemistry    Lab Results   Component Value Date/Time     05/09/2024 1239    K 4.1 05/09/2024 1239     05/09/2024 1239    CO2 28 05/09/2024 1239    BUN 20 05/09/2024 1239    CREATININE 0.75 05/09/2024 1239    Lab Results   Component Value Date/Time    CALCIUM 9.8 05/09/2024 1239    ALKPHOS 91 04/29/2024 1409    AST 33 04/29/2024 1409    ALT 15 04/29/2024 1409    BILITOT 0.4 04/29/2024 1409         Imaging:  Exam Information    Status Exam Begun Exam Ended   Final 9/18/2024 15:32 9/18/2024 15:42     Study Result    Narrative & Impression   Interpreted By:  Clair Ibarra,   STUDY:  DEXA BONE DENSITY9/18/2024 3:42 pm       INDICATION:  Signs/Symptoms:Aromatase Inhibitor. The patient is a 70 y/o  year old  F.      ,C50.912 Malignant neoplasm of unspecified site of left female breast  (Multi),Z17.0 Estrogen receptor positive status (ER+),Z79.811 Long  term (current) use of aromatase inhibitors      COMPARISON:  None.      ACCESSION NUMBER(S):  [id number]      ORDERING CLINICIAN:  BENJAMIN BILLINGSLEY      TECHNIQUE:  DEXA BONE DENSITY      FINDINGS:  SPINE L1-L4  Bone Mineral Density: 1.215  T-Score 0.3  Z-Score 1.2          LEFT FEMUR -TOTAL  Bone Mineral Density: 0.831  T-Score -1.4   Z-Score  -1.0          LEFT FEMUR -NECK  Bone Mineral Density: 0.830  T-Score -1.5  Z-Score -0.8              World Health Organization (WHO) criteria for post-menopausal,   Women:  Normal:         T-score at or above -1 SD  Osteopenia:   T-score between -1 and -2.5 SD  Osteoporosis: T-score at or below -2.5 SD          10-year Fracture Risk:  Major Osteoporotic Fracture  3.0  Hip Fracture                        0.3          This exam was performed at [facility] on a [employer] Dexa Unit.     Assessment/Plan     is a 70 yo woman with a history of left breast cancer diagnosed with left breast cancer. Patient underwent a left lumpectomy with ALND and radiation. She had a left breast recurrence. Patient underwent a left breast mastectomy with a Latissimus Dorsi Flap, anastrozole x 5 years. She developed right breast cancer in October 2021. She is s/p mastectomy with reconstruction and is s/p chemo and is currently on exemestane. There is no evidence of recurrent disease on today's exam.     Plan:  Exam negative.  Continue exemestane 25mg. Daily. Her 5 years course runs till June 2028.   See Dr. Victoria about lower leg edema.  Encouraged monthly breast self exams, plant based diet, keep alcohol <3 drinks/week, exercise at least 2.5 hours/week.  We reviewed signs/symptoms of recurrence including new masses, new pigmented  lesion, tugging or pulling of the skin, nipple discharge, rash in or around the chest area, or any new finding that doesn't resolve within a 2-3 weeks.  All of 's questions/concerns were addressed.  Over 25 minutes of time was spent with this patient with >50% of the time with education, counseling, and coordination of care.  I will see  back in December. She will call with any concerns.      Diagnoses and all orders for this visit:  Encounter for monitoring aromatase inhibitor therapy  Malignant neoplasm of right breast in female, estrogen receptor positive, unspecified site of breast  -     Clinic Appointment Request Follow Up  -     Clinic Appointment Request Follow Up; [facility]GEL[facility] ELDR[facility]GE; Future  History of antineoplastic chemotherapy  History of bilateral mastectomy  Encounter for follow-up surveillance of breast cancer        Angelic Eldridge, FERN-CNP

## 2025-06-27 NOTE — PATIENT INSTRUCTIONS
Please call us at [phone] then follow the prompts.    1. Exercise 2.5 hours per week; bone strengthening, cardio-vascular, resistance training.  2. Please do self breast exams monthly.  3. Keep alcohol under 3 drinks per week.  4. Sun safety - limit sun exposure from 11a-2p when its at its hottest, apply 15-30 sun block and re-apply every 1-2 hours if perspiring or swimming.  5. Eat a plant based diet, add in oily fishes such as mackerel, tuna, and salmon.  6. Get in at least 1,000 mg of calcium per day through diet or supplement for bone strength. Examples of foods higher in calcium are milk, yogurt, fruited yogurt, oranges, fortified orange juice, almonds, almond milk, broccoli, spinach, bok jayla, mustard greens, puddings, custards, ice cream, fortified cereals, bars, and crackers.   7. Please continue on your exemestane 25 mg tablet daily.  8. Please call the office if any new mass or rash in or around breast, or any uncontrolled symptoms that last over 2-3 weeks at [phone].  9. Exam was negative today. Please see Dr. Victoria about the swelling in your lower legs.  10. It was nice seeing you today, ! I will see you back Dec 2025.   Thank you for choosing [facility] with your care.   Have a nice spring and summer!

## 2025-08-11 ENCOUNTER — OFFICE VISIT (OUTPATIENT)
Dept: PRIMARY CARE | Facility: CLINIC | Age: 70
End: 2025-08-11
Payer: MEDICARE

## 2025-08-11 VITALS
HEART RATE: 71 BPM | SYSTOLIC BLOOD PRESSURE: 135 MMHG | BODY MASS INDEX: 44.41 KG/M2 | WEIGHT: 293 LBS | TEMPERATURE: 97.7 F | DIASTOLIC BLOOD PRESSURE: 77 MMHG | RESPIRATION RATE: 16 BRPM | HEIGHT: 68 IN | OXYGEN SATURATION: 96 %

## 2025-08-11 DIAGNOSIS — I87.2 VENOUS INSUFFICIENCY: ICD-10-CM

## 2025-08-11 DIAGNOSIS — G56.01 CARPAL TUNNEL SYNDROME OF RIGHT WRIST: Primary | ICD-10-CM

## 2025-08-11 PROCEDURE — 99215 OFFICE O/P EST HI 40 MIN: CPT | Performed by: INTERNAL MEDICINE

## 2025-08-11 PROCEDURE — 99212 OFFICE O/P EST SF 10 MIN: CPT | Mod: 25 | Performed by: INTERNAL MEDICINE

## 2025-08-11 SDOH — ECONOMIC STABILITY: FOOD INSECURITY: WITHIN THE PAST 12 MONTHS, THE FOOD YOU BOUGHT JUST DIDN'T LAST AND YOU DIDN'T HAVE MONEY TO GET MORE.: NEVER TRUE

## 2025-08-11 SDOH — ECONOMIC STABILITY: FOOD INSECURITY: WITHIN THE PAST 12 MONTHS, YOU WORRIED THAT YOUR FOOD WOULD RUN OUT BEFORE YOU GOT MONEY TO BUY MORE.: NEVER TRUE

## 2025-08-11 ASSESSMENT — PATIENT HEALTH QUESTIONNAIRE - PHQ9
1. LITTLE INTEREST OR PLEASURE IN DOING THINGS: NOT AT ALL
SUM OF ALL RESPONSES TO PHQ9 QUESTIONS 1 AND 2: 0
2. FEELING DOWN, DEPRESSED OR HOPELESS: NOT AT ALL

## 2025-08-11 ASSESSMENT — ACTIVITIES OF DAILY LIVING (ADL)
GROCERY_SHOPPING: INDEPENDENT
MANAGING_FINANCES: INDEPENDENT
BATHING: INDEPENDENT
DRESSING: INDEPENDENT
TAKING_MEDICATION: INDEPENDENT
DOING_HOUSEWORK: INDEPENDENT

## 2025-08-11 ASSESSMENT — ENCOUNTER SYMPTOMS
LOSS OF SENSATION IN FEET: 0
OCCASIONAL FEELINGS OF UNSTEADINESS: 0
DEPRESSION: 0

## 2025-08-11 ASSESSMENT — LIFESTYLE VARIABLES: HOW MANY STANDARD DRINKS CONTAINING ALCOHOL DO YOU HAVE ON A TYPICAL DAY: PATIENT DOES NOT DRINK

## 2025-08-11 ASSESSMENT — PAIN SCALES - GENERAL: PAINLEVEL_OUTOF10: 0-NO PAIN

## 2025-08-12 ASSESSMENT — ENCOUNTER SYMPTOMS
FEVER: 0
CHILLS: 0
FATIGUE: 0
ABDOMINAL PAIN: 0
APNEA: 0
CHEST TIGHTNESS: 0
SHORTNESS OF BREATH: 0
PALPITATIONS: 0
ARTHRALGIAS: 1